# Patient Record
Sex: MALE | Race: WHITE | NOT HISPANIC OR LATINO | Employment: OTHER | URBAN - NONMETROPOLITAN AREA
[De-identification: names, ages, dates, MRNs, and addresses within clinical notes are randomized per-mention and may not be internally consistent; named-entity substitution may affect disease eponyms.]

---

## 2017-02-20 PROBLEM — R53.83 FATIGUE: Status: ACTIVE | Noted: 2017-02-20

## 2017-02-20 PROBLEM — E11.9 DM (DIABETES MELLITUS) (HCC): Status: ACTIVE | Noted: 2017-02-20

## 2017-02-20 PROBLEM — I49.9 CARDIAC DYSRHYTHMIA: Status: ACTIVE | Noted: 2017-02-20

## 2017-02-20 PROBLEM — E78.5 HLD (HYPERLIPIDEMIA): Status: ACTIVE | Noted: 2017-02-20

## 2017-02-20 PROBLEM — Z95.1 S/P CABG X 3: Status: ACTIVE | Noted: 2017-02-20

## 2017-02-20 PROBLEM — I25.10 CAD (CORONARY ARTERY DISEASE): Status: ACTIVE | Noted: 2017-02-20

## 2017-02-20 PROBLEM — I10 HTN (HYPERTENSION): Status: ACTIVE | Noted: 2017-02-20

## 2017-02-20 RX ORDER — CHLORAL HYDRATE 500 MG
CAPSULE ORAL
COMMUNITY
End: 2018-02-21

## 2017-02-20 RX ORDER — NITROGLYCERIN 0.4 MG/1
0.4 TABLET SUBLINGUAL
COMMUNITY

## 2017-02-20 RX ORDER — ESCITALOPRAM OXALATE 10 MG/1
10 TABLET ORAL DAILY
COMMUNITY
End: 2019-03-26

## 2017-02-20 RX ORDER — OMEPRAZOLE 20 MG/1
20 CAPSULE, DELAYED RELEASE ORAL DAILY
COMMUNITY

## 2017-02-20 RX ORDER — ASPIRIN 81 MG/1
81 TABLET ORAL DAILY
COMMUNITY

## 2017-02-20 RX ORDER — GLIMEPIRIDE 2 MG/1
2 TABLET ORAL
COMMUNITY
End: 2019-03-26

## 2017-02-20 RX ORDER — VERAPAMIL HYDROCHLORIDE 120 MG/1
120 TABLET, FILM COATED ORAL DAILY
COMMUNITY

## 2017-02-20 RX ORDER — GLUCOSAMINE/CHONDR SU A SOD 750-600 MG
TABLET ORAL
COMMUNITY
End: 2018-02-21

## 2017-02-21 ENCOUNTER — OFFICE VISIT (OUTPATIENT)
Dept: CARDIOLOGY | Facility: CLINIC | Age: 67
End: 2017-02-21

## 2017-02-21 VITALS
HEART RATE: 60 BPM | WEIGHT: 180.6 LBS | SYSTOLIC BLOOD PRESSURE: 124 MMHG | BODY MASS INDEX: 29.02 KG/M2 | DIASTOLIC BLOOD PRESSURE: 58 MMHG | HEIGHT: 66 IN

## 2017-02-21 DIAGNOSIS — I25.10 CORONARY ARTERY DISEASE INVOLVING NATIVE HEART WITHOUT ANGINA PECTORIS, UNSPECIFIED VESSEL OR LESION TYPE: ICD-10-CM

## 2017-02-21 DIAGNOSIS — Z95.1 S/P CABG X 3: Primary | ICD-10-CM

## 2017-02-21 DIAGNOSIS — E11.9 TYPE 2 DIABETES MELLITUS WITHOUT COMPLICATION, WITHOUT LONG-TERM CURRENT USE OF INSULIN (HCC): ICD-10-CM

## 2017-02-21 PROCEDURE — 99214 OFFICE O/P EST MOD 30 MIN: CPT | Performed by: INTERNAL MEDICINE

## 2017-02-21 PROCEDURE — 93000 ELECTROCARDIOGRAM COMPLETE: CPT | Performed by: INTERNAL MEDICINE

## 2017-02-21 NOTE — PROGRESS NOTES
Subjective:     Encounter Date:02/21/2017      Patient ID: Sheeba Saunders is a 67 y.o. male.    Chief Complaint: Coronary artery disease follow-up  Coronary Artery Disease   Presents for follow-up (3v CABG '10 (LIMA-LAD, SVG-D2, SVG-PDA), subsequent  stress echo '11 low risk) visit. Pertinent negatives include no chest pain, chest pressure, chest tightness, leg swelling, palpitations or shortness of breath. Risk factors include hyperlipidemia. The symptoms have been resolved. Compliance with diet is good. Compliance with medications is good.   Hyperlipidemia   This is a chronic problem. The current episode started more than 1 year ago. Condition status: Unknown. Exacerbating diseases include diabetes. Pertinent negatives include no chest pain or shortness of breath. He is currently on no antihyperlipidemic treatment. Compliance problems include medication side effects (Previously tried lipitor and vytorin with both stopped due to intolerance).    Hypertension   This is a chronic problem. The current episode started more than 1 year ago. Pertinent negatives include no chest pain, malaise/fatigue, palpitations or shortness of breath. The current treatment provides significant improvement. There are no compliance problems.        The following portions of the patient's history were reviewed and updated as appropriate: allergies, current medications, past family history, past medical history, past social history, past surgical history and problem list.    Review of Systems   Constitution: Negative for malaise/fatigue and weight loss.   Cardiovascular: Negative for chest pain, leg swelling and palpitations.   Respiratory: Negative for chest tightness, cough and shortness of breath.               Objective:       Vitals:    02/21/17 1343   BP: 124/58   Pulse: 60        Physical Exam   Constitutional: He is oriented to person, place, and time. He appears well-developed and well-nourished. No distress.    Cardiovascular: Normal rate and regular rhythm.    Pulmonary/Chest: Effort normal and breath sounds normal. No respiratory distress.   Neurological: He is alert and oriented to person, place, and time.   Skin: Skin is warm and dry.       Lab Review:         ECG 12 Lead  Date/Time: 2/21/2017 4:57 PM  Performed by: GRISELDA RODRIGUEZ  Authorized by: GRISELDA RODRIGUEZ   Comparison: not compared with previous ECG   Rhythm: sinus rhythm  Rate: normal  Conduction: LPFB  QRS axis: right  Other findings comments: Nonspecific ST/T wave abnormality  Clinical impression: abnormal ECG        Reviewed cardiac catheterization report with subsequent operative note from 2010  Reviewed Dr. Swann's last clinic visit note from 2015  Reviewed stress echo report from 2011  Assessment/Plan:         1. Coronary artery disease: Status post three-vessel CABG 2010.  Clinically stable.  Continue aspirin 81 mg daily.  Previously intolerant to statin therapy.    2.  Dyslipidemia: As stated above, previously intolerant to both Lipitor and Vytorin.  Patient states last lipid panel was drawn in November 2016.  His goal LDL is less than 70.  Plan is to obtain lipid profile from his primary provider; if performed within 90 days within obtain approval from insurance coming for initiation of PCSK9 inhibitor.    3.  Cardiac dysrhythmia: Could not find records to substantiate this, but based upon patient's description fact he was prescribed verapamil, I believe he had nonsustained ventricular tachycardia after his initial surgery.  He has done well since being on verapamil so we will continue this at 120 mg daily.  4.  Diabetes: Management per primary provider.    ** If we obtain lipid profile and it was done greater than 90 days ago, will need to have another one drawn.  It was done within 90 days, we will move forward with authorization for PCSK9 inhibitor.  At that point, patient will need to have a short visit to come back in for  teaching.    Otherwise, routine follow-up 12 months.

## 2017-02-24 ENCOUNTER — TELEPHONE (OUTPATIENT)
Dept: CARDIOLOGY | Facility: CLINIC | Age: 67
End: 2017-02-24

## 2017-02-24 NOTE — TELEPHONE ENCOUNTER
Call placed to patient r/t start of PCSK9.  Patient will be in for initial injection/education/paperwork 3/1/17 @ 1400.  No preference per formulary, will start Repatha.

## 2017-03-01 ENCOUNTER — TELEPHONE (OUTPATIENT)
Dept: CARDIOLOGY | Facility: CLINIC | Age: 67
End: 2017-03-01

## 2017-03-01 DIAGNOSIS — E78.5 HYPERLIPIDEMIA, UNSPECIFIED HYPERLIPIDEMIA TYPE: Primary | ICD-10-CM

## 2017-03-01 NOTE — TELEPHONE ENCOUNTER
Patient arrived at 1400 for his initial PCSK9 injection. Education provided, patient verbalized understanding able to teach back and demonstrate injection. Enrollment paperwork completed with patient's signature. Repeat lab orders given to patient along with 2nd injection to be administered per patient on 3/15/17. Initial injection of Repatha 140mg/mL given subcut to the LUE. Patient tolerated w/o complaint.

## 2017-03-28 ENCOUNTER — TELEPHONE (OUTPATIENT)
Dept: CARDIOLOGY | Facility: CLINIC | Age: 67
End: 2017-03-28

## 2017-03-28 NOTE — TELEPHONE ENCOUNTER
Patient called r/t cost of PCSK9.  Per patient his cost is $330/month with [employer].  The company was going to get in touch with him r/t patient assistance but he'd not heard back.  Contact information was given to patient for f/u on patient assistance program.  Repatha was preferred.  Patient will not be able to take medication unless he receives assistance.

## 2018-02-21 ENCOUNTER — OFFICE VISIT (OUTPATIENT)
Dept: CARDIOLOGY | Facility: CLINIC | Age: 68
End: 2018-02-21

## 2018-02-21 VITALS
HEIGHT: 66 IN | HEART RATE: 57 BPM | SYSTOLIC BLOOD PRESSURE: 122 MMHG | WEIGHT: 190 LBS | BODY MASS INDEX: 30.53 KG/M2 | DIASTOLIC BLOOD PRESSURE: 60 MMHG

## 2018-02-21 DIAGNOSIS — Z95.1 S/P CABG X 3: ICD-10-CM

## 2018-02-21 DIAGNOSIS — I10 ESSENTIAL HYPERTENSION: ICD-10-CM

## 2018-02-21 DIAGNOSIS — E78.2 MIXED HYPERLIPIDEMIA: ICD-10-CM

## 2018-02-21 DIAGNOSIS — I25.10 CORONARY ARTERY DISEASE INVOLVING NATIVE HEART WITHOUT ANGINA PECTORIS, UNSPECIFIED VESSEL OR LESION TYPE: Primary | ICD-10-CM

## 2018-02-21 PROBLEM — I49.9 CARDIAC DYSRHYTHMIA: Status: RESOLVED | Noted: 2017-02-20 | Resolved: 2018-02-21

## 2018-02-21 PROCEDURE — 99214 OFFICE O/P EST MOD 30 MIN: CPT | Performed by: INTERNAL MEDICINE

## 2018-02-21 PROCEDURE — 93000 ELECTROCARDIOGRAM COMPLETE: CPT | Performed by: INTERNAL MEDICINE

## 2018-02-21 RX ORDER — ASCORBIC ACID 500 MG
500 TABLET ORAL DAILY
COMMUNITY
End: 2019-03-26

## 2018-02-21 RX ORDER — ROSUVASTATIN CALCIUM 10 MG/1
10 TABLET, COATED ORAL DAILY
COMMUNITY

## 2018-02-21 NOTE — PROGRESS NOTES
Subjective:     Encounter Date:02/21/2018      Patient ID: Sheeba Saunders is a 68 y.o. male.    Chief Complaint: CAD f/u    Mr. Saunders is a very pleasant 68-year-old male who underwent three-vessel CABG in 2010 and has been doing well since.  I last saw him in February 2017, at which point the only change that was made was an attempt to get him on PCSK9 inhibitor for hyperlipidemia since he had previously been intolerant to Crestor and Vytorin.  He did receive 2 injections but later his insurance coverage was not sufficient and he was left with too much out-of-pocket cost, resulting in discontinuation of the drug.  In the meantime, his primary physician has started on Crestor (he is unsure of the dose), and he is doing well with that.  He has no other complaints today.  Coronary Artery Disease   Presents for follow-up (3v CABG '10 (LIMA-LAD, SVG-D2, SVG-PDA), subsequent  stress echo '11 low risk) visit. Pertinent negatives include no chest pain, chest pressure, chest tightness, leg swelling, palpitations or shortness of breath. Risk factors include hyperlipidemia. The symptoms have been resolved. Compliance with diet is good. Compliance with exercise is variable. Compliance with medications is good.   Hyperlipidemia   This is a chronic problem. The current episode started more than 1 year ago. Condition status: Unknown. Exacerbating diseases include diabetes. Pertinent negatives include no chest pain, leg pain or shortness of breath. Current antihyperlipidemic treatment includes statins. Compliance problems include medication side effects and medication cost (Previously tried lipitor and vytorin with both stopped due to intolerance. Tried Repatha after '17 visit but too expensive).    Hypertension   This is a chronic problem. The current episode started more than 1 year ago. The problem is controlled. Pertinent negatives include no chest pain, malaise/fatigue, orthopnea, palpitations, PND or shortness of  breath. The current treatment provides significant improvement. There are no compliance problems.      Recently started doing some exercise as directed by diabetes education class.   Walked 1 mile yesterday without angina or dyspnea.      The following portions of the patient's history were reviewed and updated as appropriate: allergies, current medications, past family history, past medical history, past social history, past surgical history and problem list.    Review of Systems   Constitution: Negative for malaise/fatigue.   Cardiovascular: Negative for chest pain, claudication, dyspnea on exertion, leg swelling, near-syncope, orthopnea, palpitations, paroxysmal nocturnal dyspnea and syncope.   Respiratory: Negative for chest tightness and shortness of breath.    Hematologic/Lymphatic: Does not bruise/bleed easily.        Objective:      Vitals:    02/21/18 1456   BP: 122/60   Pulse: 57     Physical Exam   Constitutional: He is oriented to person, place, and time. He appears well-developed and well-nourished.   Neck: No JVD present.   Cardiovascular: Normal rate, regular rhythm, normal heart sounds and intact distal pulses.    No murmur heard.  Pulmonary/Chest: Effort normal and breath sounds normal.   Musculoskeletal: He exhibits no edema.   Neurological: He is alert and oriented to person, place, and time.   Skin: Skin is warm and dry.       Lab Review:         ECG 12 Lead  Date/Time: 2/21/2018 3:16 PM  Performed by: GRISELDA RODRIGUEZ  Authorized by: GRISELDA RODRIGUEZ   Rhythm: sinus bradycardia  BPM: 57  Conduction: LPFB  Other findings comments: Nonspecific T wave abnormality in precordial leads (unchanged from last year's ecg)  Clinical impression: abnormal ECG        LABS REVIEWED FROM June 27, 2017: , , LDL 54, HDL 33    Assessment/Plan:     Problem List Items Addressed This Visit        Cardiovascular and Mediastinum    HLD (hyperlipidemia)    Current Assessment & Plan     LDL well controlled.   Continue crestor; patient to call us back to confirm dose.         Relevant Medications    rosuvastatin (CRESTOR) 5 MG tablet    CAD (coronary artery disease) - Primary    Overview     3v CABG '10 (LIMA-LAD, SVG-D1, SVG-PDA) with subsequent low-risk stress echo in '11)         Current Assessment & Plan     Coronary artery disease is stable; asymptomatic.  Continue current treatment regimen (ASA 81mg daily for life, BB, and statin).  Weight loss; goal 15-20 lbs over next year.  Regular aerobic exercise. Gave goal of 150 minutes of moderately vigorous activity per week.  Cardiac status will be reassessed in 1 year.         Relevant Orders    ECG 12 Lead    HTN (hypertension)    Current Assessment & Plan     Hypertension is well controlled.  Continue current treatment regimen.  Blood pressure will be reassessed at the next regular appointment.            Other    S/P CABG x 3        F/u 1 year, or sooner if new symptoms develop    Wagner Bennett MD  02/21/2018  4:01 PM

## 2018-02-21 NOTE — ASSESSMENT & PLAN NOTE
Coronary artery disease is stable; asymptomatic.  Continue current treatment regimen (ASA 81mg daily for life, BB, and statin).  Weight loss; goal 15-20 lbs over next year.  Regular aerobic exercise. Gave goal of 150 minutes of moderately vigorous activity per week.  Cardiac status will be reassessed in 1 year.

## 2019-03-26 ENCOUNTER — OFFICE VISIT (OUTPATIENT)
Dept: CARDIOLOGY | Facility: CLINIC | Age: 69
End: 2019-03-26

## 2019-03-26 VITALS
SYSTOLIC BLOOD PRESSURE: 145 MMHG | BODY MASS INDEX: 29.09 KG/M2 | HEART RATE: 64 BPM | RESPIRATION RATE: 18 BRPM | HEIGHT: 66 IN | WEIGHT: 181 LBS | DIASTOLIC BLOOD PRESSURE: 80 MMHG

## 2019-03-26 DIAGNOSIS — I25.10 CORONARY ARTERY DISEASE INVOLVING NATIVE HEART WITHOUT ANGINA PECTORIS, UNSPECIFIED VESSEL OR LESION TYPE: Primary | ICD-10-CM

## 2019-03-26 DIAGNOSIS — Z95.1 S/P CABG X 3: ICD-10-CM

## 2019-03-26 DIAGNOSIS — I10 ESSENTIAL HYPERTENSION: ICD-10-CM

## 2019-03-26 DIAGNOSIS — E78.2 MIXED HYPERLIPIDEMIA: ICD-10-CM

## 2019-03-26 PROCEDURE — 99214 OFFICE O/P EST MOD 30 MIN: CPT | Performed by: NURSE PRACTITIONER

## 2019-03-26 PROCEDURE — 93000 ELECTROCARDIOGRAM COMPLETE: CPT | Performed by: NURSE PRACTITIONER

## 2019-03-26 RX ORDER — TAMSULOSIN HYDROCHLORIDE 0.4 MG/1
1 CAPSULE ORAL NIGHTLY
COMMUNITY

## 2019-03-26 RX ORDER — MAGNESIUM OXIDE 400 MG/1
400 TABLET ORAL DAILY
COMMUNITY

## 2019-03-26 NOTE — PROGRESS NOTES
Subjective:     Encounter Date:03/26/2019      Patient ID: Sheeba Saunders is a 69 y.o. male.    Chief Complaint:  Coronary Artery Disease   Presents for follow-up visit. Pertinent negatives include no chest pain, chest pressure, chest tightness, dizziness, leg swelling, muscle weakness, palpitations, shortness of breath or weight gain. Risk factors include hyperlipidemia. The symptoms have been stable. Compliance with diet is good. Compliance with exercise is good. Compliance with medications is good.   Hypertension   This is a chronic problem. The current episode started more than 1 year ago. The problem is controlled. Pertinent negatives include no chest pain, headaches, malaise/fatigue, orthopnea, palpitations, PND or shortness of breath. Past treatments include beta blockers.   Hyperlipidemia   This is a chronic problem. The current episode started more than 1 year ago. The problem is controlled. Recent lipid tests were reviewed and are low. Pertinent negatives include no chest pain or shortness of breath. Current antihyperlipidemic treatment includes statins. The current treatment provides significant improvement of lipids.     Patient presents today for yearly follow up. Patient previously followed by Dr. Swann and now by Dr. Bennett. Patient has a history of CABG in 2010 LIMA-LAD, SVG-D1 and SVG-PDA. Patient overall has done well. Patient is active and mows multiple yards and tolerates well. He sees Dr. Hathaway as his regular physician. Lipids are well controlled. Non-smoker. Denies chest pain, shortness of breath, palpitations, swelling on legs. History diabetes, hyperlipidemia and hypertension. Reports blood sugars are well controlled.     The following portions of the patient's history were reviewed and updated as appropriate: allergies, current medications, past family history, past medical history, past social history, past surgical history and problem list.   Prior to Admission medications     Medication Sig Start Date End Date Taking? Authorizing Provider   aspirin 81 MG EC tablet Take 81 mg by mouth Daily.   Yes Linnea Moreau MD   exenatide er (BYDUREON) 2 MG pen-injector injection Inject 2 mg under the skin into the appropriate area as directed 1 (One) Time Per Week.   Yes Linnea Moreau MD   magnesium oxide (MAG-OX) 400 MG tablet Take 400 mg by mouth Daily.   Yes Linnea Moreau MD   metFORMIN (GLUCOPHAGE) 1000 MG tablet Take 1,000 mg by mouth 2 (Two) Times a Day With Meals.   Yes Linnea Moreau MD   metoprolol tartrate (LOPRESSOR) 25 MG tablet Take 25 mg by mouth Daily.   Yes Linnea Moreau MD   Multiple Vitamin (MULTIVITAMINS PO) Take  by mouth.   Yes Linnea Moreau MD   nitroglycerin (NITROSTAT) 0.4 MG SL tablet Place 0.4 mg under the tongue Every 5 (Five) Minutes As Needed for chest pain. Take no more than 3 doses in 15 minutes.   Yes Linnea Moreau MD   omeprazole (priLOSEC) 20 MG capsule Take 20 mg by mouth Daily.   Yes Linnea Moreau MD   rosuvastatin (CRESTOR) 10 MG tablet Take 10 mg by mouth Daily.   Yes Linnea Moreau MD   tamsulosin (FLOMAX) 0.4 MG capsule 24 hr capsule Take 1 capsule by mouth Every Night.   Yes Linnea Moreau MD   verapamil (CALAN) 120 MG tablet Take 120 mg by mouth Daily.   Yes Linnea Moreau MD   Dulaglutide (TRULICITY) 1.5 MG/0.5ML solution pen-injector Inject  under the skin.  3/26/19 Yes Linnea Moreau MD   escitalopram (LEXAPRO) 10 MG tablet Take 10 mg by mouth Daily.  3/26/19 Yes Linnea Moreau MD   glimepiride (AMARYL) 2 MG tablet Take 2 mg by mouth Every Morning Before Breakfast.  3/26/19  Linnea Moreau MD   vitamin C (ASCORBIC ACID) 500 MG tablet Take 500 mg by mouth Daily.  3/26/19  Linnea Moreau MD     Past Medical History:   Diagnosis Date   • CAD in native artery    • Cardiac dysrhythmia    • Controlled diabetes mellitus type II without complication  (CMS/HCC)    • Fatigue    • Hyperlipidemia, mixed    • Hypertension     Benign   • S/P CABG x 3        Review of Systems   Constitution: Negative for chills, decreased appetite, fever, malaise/fatigue, weight gain and weight loss.   HENT: Negative for nosebleeds.    Eyes: Negative for visual disturbance.   Cardiovascular: Negative for chest pain, dyspnea on exertion, leg swelling, near-syncope, orthopnea, palpitations, paroxysmal nocturnal dyspnea and syncope.   Respiratory: Negative for chest tightness, cough, hemoptysis, shortness of breath and snoring.    Endocrine: Negative for cold intolerance and heat intolerance.   Hematologic/Lymphatic: Negative for bleeding problem. Does not bruise/bleed easily.   Skin: Negative for rash.   Musculoskeletal: Negative for back pain, falls and muscle weakness.   Gastrointestinal: Negative for abdominal pain, constipation, diarrhea, heartburn, melena, nausea and vomiting.   Genitourinary: Negative for hematuria.   Neurological: Negative for dizziness, headaches and light-headedness.   Psychiatric/Behavioral: Negative for altered mental status.   Allergic/Immunologic: Negative for persistent infections.         ECG 12 Lead  Date/Time: 3/26/2019 3:17 PM  Performed by: Adrian Perry APRN  Authorized by: Adrian Perry APRN   Comparison: compared with previous ECG from 2/21/2018  Similar to previous ECG  Rhythm: sinus rhythm               Objective:     Physical Exam   Constitutional: He is oriented to person, place, and time. He appears well-developed and well-nourished.   HENT:   Head: Normocephalic and atraumatic.   Eyes: Pupils are equal, round, and reactive to light.   Neck: Normal range of motion. Neck supple. No JVD present. Carotid bruit is not present.   Cardiovascular: Normal rate, regular rhythm, normal heart sounds and intact distal pulses.   Pulmonary/Chest: Effort normal and breath sounds normal.   Abdominal: Soft. Bowel sounds are normal.   Musculoskeletal:  "Normal range of motion.   Neurological: He is alert and oriented to person, place, and time. He has normal reflexes.   Skin: Skin is warm and dry.   Psychiatric: He has a normal mood and affect. His behavior is normal. Judgment and thought content normal.     Blood pressure 145/80, pulse 64, resp. rate 18, height 167.6 cm (66\"), weight 82.1 kg (181 lb).      Lab Review:       Assessment:          Diagnosis Plan   1. Coronary artery disease involving native heart without angina pectoris, unspecified vessel or lesion type     2. Mixed hyperlipidemia     3. Essential hypertension     4. S/P CABG x 3     5. BMI 29.0-29.9,adult            Plan:       1. Coronary Artery Disease- CABG with LIMA-LAD, SVG-D1 and SVG-PDA. No clinical evidence of ischemia. On aspirin and statin. LDL at goal  2. Mixed Hyperlipidemia- LDL 49  3. Blood pressure borderline high today- but on cold medicine- he follows and reports runs systolic 120  4. History of CABG  Patient's Body mass index is 29.21 kg/m². BMI is above normal parameters. Recommendations include: exercise counseling and nutrition counseling.         "

## 2020-04-22 ENCOUNTER — OFFICE VISIT (OUTPATIENT)
Dept: CARDIOLOGY | Facility: CLINIC | Age: 70
End: 2020-04-22

## 2020-04-22 VITALS — WEIGHT: 192 LBS | BODY MASS INDEX: 30.99 KG/M2 | DIASTOLIC BLOOD PRESSURE: 70 MMHG | SYSTOLIC BLOOD PRESSURE: 124 MMHG

## 2020-04-22 DIAGNOSIS — Z95.1 S/P CABG X 3: ICD-10-CM

## 2020-04-22 DIAGNOSIS — I10 ESSENTIAL HYPERTENSION: ICD-10-CM

## 2020-04-22 DIAGNOSIS — I25.10 CORONARY ARTERY DISEASE INVOLVING NATIVE HEART WITHOUT ANGINA PECTORIS, UNSPECIFIED VESSEL OR LESION TYPE: Primary | ICD-10-CM

## 2020-04-22 DIAGNOSIS — E78.2 MIXED HYPERLIPIDEMIA: ICD-10-CM

## 2020-04-22 DIAGNOSIS — E11.59 TYPE 2 DIABETES MELLITUS WITH OTHER CIRCULATORY COMPLICATION, WITHOUT LONG-TERM CURRENT USE OF INSULIN (HCC): ICD-10-CM

## 2020-04-22 PROCEDURE — 99442 PR PHYS/QHP TELEPHONE EVALUATION 11-20 MIN: CPT | Performed by: NURSE PRACTITIONER

## 2020-04-22 RX ORDER — INSULIN GLARGINE 100 [IU]/ML
40 INJECTION, SOLUTION SUBCUTANEOUS DAILY
COMMUNITY

## 2020-04-22 NOTE — PROGRESS NOTES
Subjective:     Encounter Date:04/22/2020      Patient ID: Sheeba Saunders is a 70 y.o. male with a history of coronary artery disease s/p 3v CABG (LIMA-LAD, SVG- D2, and SVG-PDA) in '10, hyperlipidemia, and hypertension.    You have chosen to receive care through a telephone visit. Do you consent to use a telephone visit for your medical care today? YES     This visit has been rescheduled as a phone visit to comply with patient safety concerns in accordance with CDC recommendations. Total time of discussion was 15 minutes.    Chief Complaint: follow up  Coronary Artery Disease   Presents for follow-up visit. Pertinent negatives include no chest pain, chest pressure, chest tightness, dizziness, leg swelling, muscle weakness, palpitations, shortness of breath or weight gain. Risk factors include hyperlipidemia and obesity. The symptoms have been stable.   Hypertension   This is a chronic problem. The current episode started more than 1 year ago. The problem has been rapidly improving since onset. The problem is controlled. Pertinent negatives include no chest pain, malaise/fatigue, orthopnea, palpitations, peripheral edema, PND or shortness of breath.   Hyperlipidemia   This is a chronic problem. The current episode started more than 1 year ago. The problem is controlled. Recent lipid tests were reviewed and are low. Exacerbating diseases include diabetes and obesity. Pertinent negatives include no chest pain or shortness of breath.     Patient was called today for a follow up. He states he has been well. 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 He denies chest pain, shortness of breath, palpitations, orthopnea, PND, swelling, decrease in stamina and increase in fatigue.     The following portions of the patient's history were reviewed and updated as appropriate: allergies, current medications, past family history, past medical history, past social history, past  surgical history and problem list.    Allergies   Allergen Reactions   • Latex Hives     blisters         Current Outpatient Medications:   •  aspirin 81 MG EC tablet, Take 81 mg by mouth Daily., Disp: , Rfl:   •  exenatide er (BYDUREON) 2 MG pen-injector injection, Inject 2 mg under the skin into the appropriate area as directed 1 (One) Time Per Week., Disp: , Rfl:   •  Insulin Glargine (BASAGLAR KWIKPEN) 100 UNIT/ML injection pen, Inject 40 Units under the skin into the appropriate area as directed Daily., Disp: , Rfl:   •  magnesium oxide (MAG-OX) 400 MG tablet, Take 400 mg by mouth Daily., Disp: , Rfl:   •  metoprolol tartrate (LOPRESSOR) 25 MG tablet, Take 25 mg by mouth Daily., Disp: , Rfl:   •  Multiple Vitamin (MULTIVITAMINS PO), Take 1 tablet by mouth Daily., Disp: , Rfl:   •  nitroglycerin (NITROSTAT) 0.4 MG SL tablet, Place 0.4 mg under the tongue Every 5 (Five) Minutes As Needed for chest pain. Take no more than 3 doses in 15 minutes., Disp: , Rfl:   •  omeprazole (priLOSEC) 20 MG capsule, Take 20 mg by mouth Daily., Disp: , Rfl:   •  rosuvastatin (CRESTOR) 10 MG tablet, Take 10 mg by mouth Daily., Disp: , Rfl:   •  tamsulosin (FLOMAX) 0.4 MG capsule 24 hr capsule, Take 1 capsule by mouth Every Night., Disp: , Rfl:   •  verapamil (CALAN) 120 MG tablet, Take 120 mg by mouth Daily., Disp: , Rfl:   Past Medical History:   Diagnosis Date   • CAD in native artery    • Cardiac dysrhythmia    • Controlled diabetes mellitus type II without complication (CMS/HCC)    • Fatigue    • Hyperlipidemia, mixed    • Hypertension     Benign   • S/P CABG x 3        Social History     Socioeconomic History   • Marital status: Unknown     Spouse name: Not on file   • Number of children: Not on file   • Years of education: Not on file   • Highest education level: Not on file   Tobacco Use   • Smoking status: Former Smoker     Last attempt to quit:      Years since quittin.3   • Smokeless tobacco: Never Used    Substance and Sexual Activity   • Alcohol use: No   • Drug use: No   • Sexual activity: Defer       Review of Systems   Constitution: Negative for malaise/fatigue, weight gain and weight loss.   Cardiovascular: Negative for chest pain, dyspnea on exertion, irregular heartbeat, leg swelling, near-syncope, orthopnea, palpitations, paroxysmal nocturnal dyspnea and syncope.   Respiratory: Negative for chest tightness, cough, shortness of breath, sleep disturbances due to breathing, sputum production and wheezing.    Skin: Negative for dry skin, flushing, itching and rash.   Musculoskeletal: Negative for muscle weakness.   Gastrointestinal: Negative for hematemesis and hematochezia.   Neurological: Negative for dizziness, light-headedness, loss of balance and weakness.       Procedures  /70   Wt 87.1 kg (192 lb)   BMI 30.99 kg/m²        Objective:     Physical Exam   Constitutional: He is oriented to person, place, and time. Vital signs are normal. No distress.   Pulmonary/Chest: No respiratory distress.   Neurological: He is alert and oriented to person, place, and time.   Psychiatric: He has a normal mood and affect. His speech is normal and behavior is normal. Judgment and thought content normal. Cognition and memory are normal.       Lab Review:   I have reviewed previous office notes, most recent labs and most recent cardiac testing.          Assessment:          Diagnosis Plan   1. Coronary artery disease involving native heart without angina pectoris, unspecified vessel or lesion type     2. S/P CABG x 3     3. Essential hypertension     4. Mixed hyperlipidemia     5. Type 2 diabetes mellitus with other circulatory complication, without long-term current use of insulin (CMS/[facility])          Plan:       1. CAD- stable. No clinical signs of ischemia. On ASA, statin, BB and CCB.   2. S/p CABG- 3v in '10  3. HTN- controlled. CPT.  4. HLD- reports controlled. Last LDL 49 in '19. On statin. Has had labs done with  his PCP a few weeks ago. Will obtain these results.   5. DM2- 7.7 A1C. Followed by PCP.     Follow up in 1 year or sooner if symptoms worsen.

## 2020-04-23 ENCOUNTER — TELEPHONE (OUTPATIENT)
Dept: CARDIOLOGY | Facility: CLINIC | Age: 70
End: 2020-04-23

## 2020-04-23 DIAGNOSIS — E78.2 MIXED HYPERLIPIDEMIA: Primary | ICD-10-CM

## 2020-04-23 NOTE — TELEPHONE ENCOUNTER
Patient called and left a voicemail stating that he had a missed call from CANDY Finley.  Please call when available.

## 2021-03-10 ENCOUNTER — OFFICE VISIT (OUTPATIENT)
Dept: GASTROENTEROLOGY | Age: 71
End: 2021-03-10

## 2021-03-10 VITALS
HEIGHT: 66 IN | WEIGHT: 201.8 LBS | HEART RATE: 59 BPM | BODY MASS INDEX: 32.43 KG/M2 | OXYGEN SATURATION: 96 % | SYSTOLIC BLOOD PRESSURE: 120 MMHG | DIASTOLIC BLOOD PRESSURE: 70 MMHG

## 2021-03-10 DIAGNOSIS — Z12.11 SCREENING FOR COLON CANCER: ICD-10-CM

## 2021-03-10 DIAGNOSIS — Z86.010 HISTORY OF ADENOMATOUS POLYP OF COLON: Primary | ICD-10-CM

## 2021-03-10 PROCEDURE — 99999 PR OFFICE/OUTPT VISIT,PROCEDURE ONLY: CPT | Performed by: NURSE PRACTITIONER

## 2021-03-10 RX ORDER — TAMSULOSIN HYDROCHLORIDE 0.4 MG/1
0.4 CAPSULE ORAL DAILY
COMMUNITY

## 2021-03-10 RX ORDER — NITROGLYCERIN 0.4 MG/1
0.4 TABLET SUBLINGUAL EVERY 5 MIN PRN
COMMUNITY

## 2021-03-10 RX ORDER — INSULIN GLARGINE 100 [IU]/ML
40 INJECTION, SOLUTION SUBCUTANEOUS NIGHTLY
COMMUNITY

## 2021-03-10 RX ORDER — CALCIUM CARBONATE 300MG(750)
1 TABLET,CHEWABLE ORAL DAILY
COMMUNITY

## 2021-03-10 ASSESSMENT — ENCOUNTER SYMPTOMS
NAUSEA: 0
SHORTNESS OF BREATH: 0
RECTAL PAIN: 0
DIARRHEA: 0
ABDOMINAL PAIN: 0
BLOOD IN STOOL: 0
COUGH: 0
ABDOMINAL DISTENTION: 0
TROUBLE SWALLOWING: 0
CONSTIPATION: 0
CHOKING: 0
VOICE CHANGE: 0
VOMITING: 0

## 2021-03-10 NOTE — PATIENT INSTRUCTIONS
Schedule colonoscopy. No aspirin, ibuprofen, naproxen, fish oil or vitamin E for 5 days before procedure. Do not eat or drink after midnight the day of the procedure. Allowed medications can be taken with a small sip of water. Please review your prep instructions for allowed medications. You will not be able to drive for 24 hours after the procedure due to sedation. Bring a  with you the day of the procedure. If you are on blood thinners, clearance from the prescribing physician will be obtained before your procedure is scheduled. If it is determined it is not safe to hold these medications for a short time an alternative procedure for evaluation may be recommended. Risks of colonoscopy include, but are not limited to, perforation, bleeding, and infection, Risk of perforation and bleeding are increased if there is a polyp removed. Anesthesia risks will be reviewed with you before the procedure by a member of the anesthesia department. Your physician may also schedule a follow up appointment with the nurse practitioner to discuss pathology, symptoms or to check if you have had any problems related to your procedure. If you prefer not to return to the office after your procedure please discuss this with your physician on the day of your colonoscopy. The physician will talk with you and/or your family after the procedure is completed. Final recommendations are based on the pathologist report if biopsies or specimens are taken. For Colonoscopy: You will be given specific directions regarding restrictions to diet and bowel prep instructions including laxatives. Please read these instructions one week prior to your scheduled procedure to ensure that you are prepared. If you have any questions regarding these instructions please call our office Mon through Fri from 8:00 am to 4:00 pm.     Follow prep instructions provided for bowel prep. Take all of the bowel prep as directed.  If you are having problems with nausea, stop your prep for 30-45 min to allow the nausea to subside before resuming your prep. It is important to drink plenty of fluids throughout the day before taking your laxatives. This will help to protect your kidneys, prevent dehydration and maximize the effect of the bowel prep. If polyps are removed during the procedure they will be sent to a pathologist for analysis. Unless you have a follow up appointment scheduled, you will be notified by mail of the pathology results within 4 weeks. If you have not received results after 4 weeks you may call the office to obtain this information. Your diet before a colonoscopy bowel preparation is very important to ensure a successful colon exam. It is recommended to consider certain changes to your diet three to four days prior to the procedure. Remember that your bowels need to be empty for the exam.    What foods are good to eat? Cut down on heavy solid foods three to four days before the procedure and start introducing lighter meals to your diet. The following food suggestions are a good part of your diet before a colonoscopy bowel preparation. Light meat that is easily digestible such as chicken (without the skin)   Potatoes without skin   Cheese   Eggs   A light meal of steamed white fish   Light clear soups    Foods and drinks to avoid  Avoid foods that contain too much fiber. Stay clear of dark colored beverages. They can stick to the walls of the digestive tract and make it difficult to differentiate from blood. Some of these foods are:  Red meat, rice, nuts and vegetables   Milk, other milk based fluids and cream   Most fruit and puddings   Whole grain pasta   Cereals, bran and seeds   Colored beverages, especially those that are red or purple in color   Red colored Jell-O   On the day before the colonoscopy, continue to drink plenty of clear fluids.  It is important   to keep yourself hydrated before the exam. Please follow all instructions as provided for cleansing the bowel. Failure to have an adequately prepped colon may cause you to have incomplete exam with further testing required.      [email or web address]

## 2021-03-10 NOTE — PROGRESS NOTES
Subjective:      Patient ID: Osmel Ramsey is a 66 y.o. male  Bijan Fields  No ref. provider found    HPI  Chief Complaint   Patient presents with    Colon Cancer Screening       Patient with history of TVA. Last c-scope 3/2016 negative. Due for repeat screening colonoscopy. The patient denies abdominal or flank pain, anorexia, nausea or vomiting, dysphagia, change in bowel habits or black or bloody stools or weight loss. Assessment:      1. History of adenomatous polyp of colon    2. Screening for colon cancer            Plan:      Schedule colonoscopy    Instruct on bowel prep. Nothing to eat or drink after midnight the day of the exam.  Unable to drive for 24 hours after the procedure. No aspirin or nonsteroidal anti-inflammatories for 5 days before procedure. I have discussed the benefits, alternatives, and risks (including bleeding, perforation and death)  for pursuing Endoscopy (EGD/Colonscopy/EUS/ERCP) with the patient and they are willing to continue. We also discussed the need for anesthesia, IV access, proper dietary changes, medication changes if necessary, and need for bowel prep (if ordered) prior to their Endoscopic procedure. They are aware they must have someone accompany them to their scheduled procedure to drive them home - they agree to the above and are willing to continue.      Plan for anesthesia: MAC  no reported complications                    Family History   Problem Relation Age of Onset    Cancer Mother         stomach cancer    Stomach Cancer Mother     Cancer Father         pancreatic cancer    Colon Cancer Neg Hx     Colon Polyps Neg Hx     Esophageal Cancer Neg Hx     Liver Cancer Neg Hx     Liver Disease Neg Hx     Rectal Cancer Neg Hx        Past Medical History:   Diagnosis Date    History of adenomatous polyp of colon     Hyperlipidemia     Hypertension     Type II or unspecified type diabetes mellitus without mention of complication, not stated as uncontrolled        Past Surgical History:   Procedure Laterality Date    COLONOSCOPY  5-    Dr Corrine Nickerson    COLONOSCOPY  1/2013    tubulovillous adenoma of cecum    COLONOSCOPY  3/11/16    Dr GRETA Fulton-diverticular disease, 5 yr recall    CORONARY ARTERY BYPASS GRAFT         Current Outpatient Medications   Medication Sig Dispense Refill    insulin glargine (BASAGLAR KWIKPEN) 100 UNIT/ML injection pen Inject 40 Units into the skin nightly      tamsulosin (FLOMAX) 0.4 MG capsule Take 0.4 mg by mouth daily      Rosuvastatin Calcium 10 MG CPSP Take 1 capsule by mouth nightly      nitroGLYCERIN (NITROSTAT) 0.4 MG SL tablet Place 0.4 mg under the tongue every 5 minutes as needed for Chest pain up to max of 3 total doses. If no relief after 1 dose, call 911.  Magnesium 400 MG TABS Take 1 tablet by mouth daily      Bioflavonoid Products (BIOFLEX PO) Take 1,500 mg by mouth daily      omeprazole (PRILOSEC) 20 MG capsule Take 20 mg by mouth daily      verapamil (CALAN) 120 MG tablet Take 120 mg by mouth daily      exenatide (BYDUREON) 2 MG SUSR injection Inject 2 mg into the skin once a week      Metoprolol Tartrate (LOPRESSOR PO) Take 25 mg by mouth daily Indications: half daily       aspirin 81 MG tablet Take 81 mg by mouth daily.  multivitamin (THERAGRAN) per tablet Take 1 tablet by mouth daily. No current facility-administered medications for this visit. Allergies   Allergen Reactions    Latex Hives     blisters        reports that he quit smoking about 44 years ago. His smoking use included cigarettes. He smoked 0.00 packs per day for 0.00 years. He has never used smokeless tobacco. He reports that he does not drink alcohol or use drugs. Review of Systems   Constitutional: Negative for appetite change, fever and unexpected weight change. HENT: Negative for trouble swallowing and voice change. Respiratory: Negative for cough, choking and shortness of breath. Cardiovascular: Negative for chest pain and palpitations. Gastrointestinal: Negative for abdominal distention, abdominal pain, blood in stool, constipation, diarrhea, nausea, rectal pain and vomiting. Musculoskeletal: Negative for arthralgias and joint swelling. Skin: Negative for pallor, rash and wound. Neurological: Negative for weakness and light-headedness. Hematological: Negative for adenopathy. Does not bruise/bleed easily. Objective:   Physical Exam  Constitutional:       General: He is not in acute distress. Appearance: Normal appearance. He is well-developed. Comments: /70 (Site: Left Upper Arm, Position: Sitting, Cuff Size: Medium Adult)   Pulse 59   Ht 5' 6\" (1.676 m)   Wt 201 lb 12.8 oz (91.5 kg)   SpO2 96%   BMI 32.57 kg/m²    Cardiovascular:      Rate and Rhythm: Normal rate and regular rhythm. Heart sounds: No murmur. Pulmonary:      Effort: Pulmonary effort is normal. No respiratory distress. Breath sounds: Normal breath sounds. Abdominal:      General: Bowel sounds are normal. There is no distension. Palpations: Abdomen is soft. There is no mass. Tenderness: There is no abdominal tenderness. There is no guarding or rebound. Skin:     General: Skin is warm and dry. Coloration: Skin is not pale. Neurological:      Mental Status: He is alert and oriented to person, place, and time.

## 2021-03-26 ENCOUNTER — OFFICE VISIT (OUTPATIENT)
Age: 71
End: 2021-03-26

## 2021-03-26 VITALS — OXYGEN SATURATION: 96 % | HEART RATE: 70 BPM

## 2021-03-26 DIAGNOSIS — Z11.59 SCREENING FOR VIRAL DISEASE: Primary | ICD-10-CM

## 2021-03-26 LAB — SARS-COV-2, PCR: NOT DETECTED

## 2021-03-26 PROCEDURE — 99999 PR OFFICE/OUTPT VISIT,PROCEDURE ONLY: CPT | Performed by: NURSE PRACTITIONER

## 2021-03-26 NOTE — PROGRESS NOTES
Patient was not evaluated in the clinic, swab was collected due to pre-op testing requirement to screen for COVID-19 using [employer] Max testing. ,

## 2021-03-29 ENCOUNTER — HOSPITAL ENCOUNTER (OUTPATIENT)
Age: 71
Setting detail: SPECIMEN
Discharge: HOME | End: 2021-03-29
Payer: MEDICARE

## 2021-03-29 ENCOUNTER — HOSPITAL ENCOUNTER (OUTPATIENT)
Age: 71
Setting detail: OUTPATIENT SURGERY
Discharge: HOME | End: 2021-03-29
Attending: INTERNAL MEDICINE | Admitting: INTERNAL MEDICINE
Payer: MEDICARE

## 2021-03-29 ENCOUNTER — ANESTHESIA (OUTPATIENT)
Dept: OPERATING ROOM | Age: 71
End: 2021-03-29

## 2021-03-29 ENCOUNTER — APPOINTMENT (OUTPATIENT)
Dept: OPERATING ROOM | Age: 71
End: 2021-03-29

## 2021-03-29 ENCOUNTER — ANESTHESIA EVENT (OUTPATIENT)
Dept: OPERATING ROOM | Age: 71
End: 2021-03-29

## 2021-03-29 VITALS
RESPIRATION RATE: 18 BRPM | HEART RATE: 54 BPM | OXYGEN SATURATION: 99 % | BODY MASS INDEX: 31.18 KG/M2 | DIASTOLIC BLOOD PRESSURE: 73 MMHG | SYSTOLIC BLOOD PRESSURE: 159 MMHG | HEIGHT: 66 IN | TEMPERATURE: 97.7 F | WEIGHT: 194 LBS

## 2021-03-29 VITALS — OXYGEN SATURATION: 97 % | SYSTOLIC BLOOD PRESSURE: 140 MMHG | DIASTOLIC BLOOD PRESSURE: 70 MMHG

## 2021-03-29 PROCEDURE — 45381 COLONOSCOPY SUBMUCOUS NJX: CPT | Performed by: INTERNAL MEDICINE

## 2021-03-29 PROCEDURE — 45385 COLONOSCOPY W/LESION REMOVAL: CPT | Performed by: INTERNAL MEDICINE

## 2021-03-29 PROCEDURE — G8907 PT DOC NO EVENTS ON DISCHARG: HCPCS

## 2021-03-29 PROCEDURE — 45381 COLONOSCOPY SUBMUCOUS NJX: CPT

## 2021-03-29 PROCEDURE — 88305 TISSUE EXAM BY PATHOLOGIST: CPT

## 2021-03-29 PROCEDURE — 45385 COLONOSCOPY W/LESION REMOVAL: CPT

## 2021-03-29 PROCEDURE — G8918 PT W/O PREOP ORDER IV AB PRO: HCPCS

## 2021-03-29 RX ORDER — SODIUM CHLORIDE 9 MG/ML
INJECTION, SOLUTION INTRAVENOUS CONTINUOUS
Status: DISCONTINUED | OUTPATIENT
Start: 2021-03-29 | End: 2021-03-29 | Stop reason: HOSPADM

## 2021-03-29 RX ORDER — PROPOFOL 10 MG/ML
INJECTION, EMULSION INTRAVENOUS PRN
Status: DISCONTINUED | OUTPATIENT
Start: 2021-03-29 | End: 2021-03-29 | Stop reason: SDUPTHER

## 2021-03-29 RX ORDER — LIDOCAINE HYDROCHLORIDE 10 MG/ML
INJECTION, SOLUTION INFILTRATION; PERINEURAL PRN
Status: DISCONTINUED | OUTPATIENT
Start: 2021-03-29 | End: 2021-03-29 | Stop reason: SDUPTHER

## 2021-03-29 RX ADMIN — LIDOCAINE HYDROCHLORIDE 40 MG: 10 INJECTION, SOLUTION INFILTRATION; PERINEURAL at 10:00

## 2021-03-29 RX ADMIN — SODIUM CHLORIDE: 9 INJECTION, SOLUTION INTRAVENOUS at 09:24

## 2021-03-29 RX ADMIN — PROPOFOL 200 MG: 10 INJECTION, EMULSION INTRAVENOUS at 10:00

## 2021-03-29 NOTE — ANESTHESIA PRE PROCEDURE
Department of Anesthesiology  Preprocedure Note       Name:  Kenneth Cheng   Age:  66 y.o.  :  1950                                          MRN:  [id number]         Date:  3/29/2021      Surgeon: Kathi Johnson):  Dank Arita MD    Procedure: Procedure(s):  COLORECTAL CANCER SCREENING, NOT HIGH RISK    Medications prior to admission:   Prior to Admission medications    Medication Sig Start Date End Date Taking? Authorizing Provider   verapamil (CALAN) 120 MG tablet Take 120 mg by mouth daily   Yes Historical Provider, MD   Metoprolol Tartrate (LOPRESSOR PO) Take 25 mg by mouth daily Indications: half daily    Yes Historical Provider, MD   insulin glargine (BASAGLAR KWIKPEN) 100 UNIT/ML injection pen Inject 40 Units into the skin nightly    Historical Provider, MD   tamsulosin (FLOMAX) 0.4 MG capsule Take 0.4 mg by mouth daily    Historical Provider, MD   Rosuvastatin Calcium 10 MG CPSP Take 1 capsule by mouth nightly    Historical Provider, MD   nitroGLYCERIN (NITROSTAT) 0.4 MG SL tablet Place 0.4 mg under the tongue every 5 minutes as needed for Chest pain up to max of 3 total doses. If no relief after 1 dose, call 911. Historical Provider, MD   Magnesium 400 MG TABS Take 1 tablet by mouth daily    Historical Provider, MD   Bioflavonoid Products (BIOFLEX PO) Take 1,500 mg by mouth daily    Historical Provider, MD   omeprazole (PRILOSEC) 20 MG capsule Take 20 mg by mouth daily    Historical Provider, MD   exenatide (BYDUREON) 2 MG SUSR injection Inject 2 mg into the skin once a week    Historical Provider, MD   aspirin 81 MG tablet Take 81 mg by mouth daily. Historical Provider, MD   multivitamin [facility]) per tablet Take 1 tablet by mouth daily.       Historical Provider, MD       Current medications:    Current Facility-Administered Medications   Medication Dose Route Frequency Provider Last Rate Last Admin    0.9 % sodium chloride infusion   Intravenous Continuous Dank Arita MD HGB, HCT, MCV, RDW, PLT    CMP: No results found for: NA, K, CL, CO2, BUN, CREATININE, GFRAA, AGRATIO, LABGLOM, GLUCOSE, PROT, CALCIUM, BILITOT, ALKPHOS, AST, ALT    POC Tests: No results for input(s): POCGLU, POCNA, POCK, POCCL, POCBUN, POCHEMO, POCHCT in the last 72 hours. Coags: No results found for: PROTIME, INR, APTT    HCG (If Applicable): No results found for: PREGTESTUR, PREGSERUM, HCG, HCGQUANT     ABGs: No results found for: PHART, PO2ART, YDT7SJE, IAM1TFO, BEART, M1ZTLQWN     Type & Screen (If Applicable):  No results found for: LABABO, LABRH    Drug/Infectious Status (If Applicable):  No results found for: HIV, HEPCAB    COVID-19 Screening (If Applicable):   Lab Results   Component Value Date    COVID19 Not Detected 03/26/2021           Anesthesia Evaluation  Patient summary reviewed and Nursing notes reviewed  Airway: Mallampati: II  TM distance: >3 FB   Neck ROM: full  Mouth opening: < 3 FB Dental: normal exam         Pulmonary:normal exam                              ROS comment: Former smoker- quit 1976   Cardiovascular:    (+) hypertension:, CABG/stent (CABG 2010):, hyperlipidemia         Beta Blocker:  Dose within 24 Hrs         Neuro/Psych:   Negative Neuro/Psych ROS              GI/Hepatic/Renal:   (+) bowel prep,          ROS comment: BMI 32. Endo/Other:    (+) Diabetes, . Abdominal:           Vascular: negative vascular ROS. Anesthesia Plan      general and TIVA     ASA 3       Induction: intravenous. Anesthetic plan and risks discussed with patient.                     KATI Blakely - CRNA   3/29/2021

## 2021-03-29 NOTE — H&P
Patient Name: Nakia Wolf  : 1950  MRN: [id number]  DATE: 21    Allergies: Allergies   Allergen Reactions    Latex Hives     blisters        ENDOSCOPY  History and Physical    Procedure:    [] Diagnostic Colonoscopy       [x] Screening Colonoscopy  [] EGD      [] ERCP      [] EUS       [] Other    [x] Previous office notes/History and Physical reviewed from the patients chart. Please see EMR for further details of HPI. I have examined the patient's status immediately prior to the procedure and:      Indications/HPI:       [x] Screening              [x] History of Polyps      []Fhx of colon CA/polyps       Anesthesia:   [x] MAC [] Moderate Sedation   [] General   [] None     ROS: 12 pt review of systems was negative unless stated above    Medications:   Prior to Admission medications    Medication Sig Start Date End Date Taking? Authorizing Provider   insulin glargine (BASAGLAR KWIKPEN) 100 UNIT/ML injection pen Inject 40 Units into the skin nightly   Yes Historical Provider, MD   tamsulosin (FLOMAX) 0.4 MG capsule Take 0.4 mg by mouth daily   Yes Historical Provider, MD   Rosuvastatin Calcium 10 MG CPSP Take 1 capsule by mouth nightly   Yes Historical Provider, MD   Magnesium 400 MG TABS Take 1 tablet by mouth daily   Yes Historical Provider, MD   Bioflavonoid Products (BIOFLEX PO) Take 1,500 mg by mouth daily   Yes Historical Provider, MD   omeprazole (PRILOSEC) 20 MG capsule Take 20 mg by mouth daily   Yes Historical Provider, MD   verapamil (CALAN) 120 MG tablet Take 120 mg by mouth daily   Yes Historical Provider, MD   exenatide (BYDUREON) 2 MG SUSR injection Inject 2 mg into the skin once a week   Yes Historical Provider, MD   Metoprolol Tartrate (LOPRESSOR PO) Take 25 mg by mouth daily Indications: half daily    Yes Historical Provider, MD   aspirin 81 MG tablet Take 81 mg by mouth daily.      Yes Historical Provider, MD   multivitamin [facility]) per tablet Take 1 tablet by mouth this documentation as scribed by Noel Montana, and it appears accurate and complete.      Gregory Wheeler MD  3/29/2021

## 2021-03-29 NOTE — OP NOTE
Patient: Maggie Hannah : 1950  Med Rec#: [id number] Acc#: [id number]   Primary Care Provider Frankey Cooler    Date of Procedure:  3/29/2021    Endoscopist: Zina Hodgkins, MD    Referring Provider: KATI Hartman    Operation Performed:   Colonoscopy with hot snare polypectomy  Colonoscopy with submucosal injection    Indications: Screening- hx of polyps    Anesthesia:  Sedation was administered by anesthesia who monitored the patient during the procedure. I met with Maggie Hannah prior to procedure. We discussed the procedure itself, and I have discussed the risks of endoscopy (including-- but not limited to-- pain, discomfort, bleeding potentially requiring second endoscopic procedure and/or blood transfusion, organ perforation requiring operative repair, damage to organs near the colon, infection, aspiration, cardiopulmonary/allergic reaction), benefits, indications to endoscopy. Additionally, we discussed options other than colonoscopy. The patient expressed understanding. All questions answered. The patient decided to proceed with the procedure. Signed informed consent was placed on the chart. Blood Loss: minimal    Withdrawal time: > 6 min  Bowel Prep: adequate     Complications: no immediate complications    DESCRIPTION OF PROCEDURE:     A time out was performed. After written informed consent was obtained, the patient was placed in the left lateral position. The perianal area was inspected, and a digital rectal exam was performed. A rectal exam was performed: normal tone, no palpable lesions. At this point, a forward viewing Olympus colonoscope was inserted into the anus and carefully advanced to the cecum. The cecum was identified by the ileocecal valve and the appendiceal orifice. The colonoscope was then slowly withdrawn with careful inspection of the mucosa in a linear and circumferential fashion. The scope was retroflexed in the rectum.  Suction was utilized during the procedure to remove as much air as possible from the bowel. The colonoscope was removed from the patient, and the procedure was terminated. Findings are listed below. Findings: The mucosa appeared normal throughout the entire examined colon. In the right colon there was a lipoma. In the rectum, a 13 mm, broad-based, sessile polyp was removed completely with hot snare polypectomy. Tattoo was placed at the resection site. There was evidence of diverticular disease throughout the left colon. Retroflexion in the rectum was normal and revealed no further abnormalities. Recommendations:  1. Repeat colonoscopy: pending pathology - max of 1 year. 2. Await biopsy results-you will receive a letter with your results. Findings and recommendations were discussed w/ the patient. A copy of the images was provided. Kishan Birmingham am scribing for and in the presence of Dr. Noris Pritchard MD.  Electronically signed by Luisa Young RN on 3/29/2021 at 9:25 AM    I personally performed the services described in this documentation as scribed by Bety Harper, and it appears accurate and complete.      Noris Pritchard MD  3/29/2021

## 2021-03-29 NOTE — PROGRESS NOTES
IN TO SEE PT/FAMILY TO DISCUSS RESULTS OF PROCEDURE. PT AND FAMILY VERBALIZED UNDERSTANDING OF DISCHARGE INSTRUCTIONS.

## 2021-05-10 ENCOUNTER — OFFICE VISIT (OUTPATIENT)
Dept: CARDIOLOGY | Facility: CLINIC | Age: 71
End: 2021-05-10

## 2021-05-10 VITALS
BODY MASS INDEX: 27.48 KG/M2 | HEART RATE: 57 BPM | OXYGEN SATURATION: 98 % | DIASTOLIC BLOOD PRESSURE: 70 MMHG | WEIGHT: 171 LBS | SYSTOLIC BLOOD PRESSURE: 138 MMHG | HEIGHT: 66 IN

## 2021-05-10 DIAGNOSIS — I25.10 CORONARY ARTERY DISEASE INVOLVING NATIVE HEART WITHOUT ANGINA PECTORIS, UNSPECIFIED VESSEL OR LESION TYPE: Primary | ICD-10-CM

## 2021-05-10 DIAGNOSIS — E11.59 TYPE 2 DIABETES MELLITUS WITH OTHER CIRCULATORY COMPLICATION, WITH LONG-TERM CURRENT USE OF INSULIN (HCC): ICD-10-CM

## 2021-05-10 DIAGNOSIS — I10 ESSENTIAL HYPERTENSION: ICD-10-CM

## 2021-05-10 DIAGNOSIS — E78.2 MIXED HYPERLIPIDEMIA: ICD-10-CM

## 2021-05-10 DIAGNOSIS — Z79.4 TYPE 2 DIABETES MELLITUS WITH OTHER CIRCULATORY COMPLICATION, WITH LONG-TERM CURRENT USE OF INSULIN (HCC): ICD-10-CM

## 2021-05-10 PROCEDURE — 99213 OFFICE O/P EST LOW 20 MIN: CPT | Performed by: NURSE PRACTITIONER

## 2021-05-10 PROCEDURE — 93000 ELECTROCARDIOGRAM COMPLETE: CPT | Performed by: NURSE PRACTITIONER

## 2021-05-10 RX ORDER — RUTIN/HESP/BIOFLAV/C/HERBAL196 40-25-50MG
1500 TABLET ORAL DAILY
COMMUNITY

## 2021-05-10 NOTE — PROGRESS NOTES
Subjective:     Encounter Date:05/10/2021      Patient ID: Sheeba Saunders is a 71 y.o. male.    Chief Complaint: Follow-up CAD    The patient presents to follow-up regarding his history of coronary artery disease status post three-vessel CABG in 2010.  At his visit in February 2017 an attempt was made to get him on a PCSK9 inhibitor for hyperlipidemia since he had previously been intolerant to Crestor and Vytorin.  He did receive 2 injections, but later his insurance coverage was not sufficient and he was left with too much out-of-pocket cost, resulting in discontinuation of the medication.  His PCP later put him on low-dose Crestor and he was doing well with this.  He last saw Dr. Bennett in February 2018 and he was without complaint that day.  He had walked 1 mile the day before without any chest pain or shortness of breath.  No changes were made to medical therapy.  He followed up again in early 2019 and was without complaint-no changes were made, and he followed up in the spring 2020-telephone visit at that time.  He reported he was mowing a few yards per week and was not having any symptoms.  No changes were made.    Today the patient reports he continues to do well overall.  He denies any declines in his stamina.  He denies chest pain, shortness of breath, edema, orthopnea, PND, palpitations, syncope or presyncope.  He reports compliance with his medications and good blood pressure control.  His A1c is 7.95, but he states his more recent blood glucose levels are reflective of better control than this-he states they have been 110s to 130s.      The following portions of the patient's history were reviewed and updated as appropriate: allergies, current medications, past family history, past medical history, past social history, past surgical history and problem list.    Review of Systems   Constitutional: Negative for malaise/fatigue.   Cardiovascular: Negative for chest pain, claudication, dyspnea on  "exertion, leg swelling, near-syncope, orthopnea, palpitations, paroxysmal nocturnal dyspnea and syncope.   Respiratory: Negative for cough and shortness of breath.    Hematologic/Lymphatic: Does not bruise/bleed easily.   Musculoskeletal: Negative for falls.   Gastrointestinal: Negative for bloating.   Neurological: Negative for dizziness, light-headedness and weakness.       Allergies   Allergen Reactions   • Dapagliflozin Other (See Comments)   • Latex Hives     blisters         Current Outpatient Medications:   •  aspirin 81 MG EC tablet, Take 81 mg by mouth Daily., Disp: , Rfl:   •  Bioflavonoid Products (Bioflex) tablet, Take 1,500 mg by mouth Daily., Disp: , Rfl:   •  exenatide er (BYDUREON) 2 MG pen-injector injection, Inject 2 mg under the skin into the appropriate area as directed 1 (One) Time Per Week., Disp: , Rfl:   •  Insulin Glargine (BASAGLAR KWIKPEN) 100 UNIT/ML injection pen, Inject 40 Units under the skin into the appropriate area as directed Daily., Disp: , Rfl:   •  magnesium oxide (MAG-OX) 400 MG tablet, Take 400 mg by mouth Daily., Disp: , Rfl:   •  metoprolol tartrate (LOPRESSOR) 25 MG tablet, Take 25 mg by mouth 2 (Two) Times a Day., Disp: , Rfl:   •  Multiple Vitamin (MULTIVITAMINS PO), Take 1 tablet by mouth Daily., Disp: , Rfl:   •  nitroglycerin (NITROSTAT) 0.4 MG SL tablet, Place 0.4 mg under the tongue Every 5 (Five) Minutes As Needed for chest pain. Take no more than 3 doses in 15 minutes., Disp: , Rfl:   •  omeprazole (priLOSEC) 20 MG capsule, Take 20 mg by mouth Daily., Disp: , Rfl:   •  rosuvastatin (CRESTOR) 10 MG tablet, Take 10 mg by mouth Daily., Disp: , Rfl:   •  tamsulosin (FLOMAX) 0.4 MG capsule 24 hr capsule, Take 1 capsule by mouth Every Night., Disp: , Rfl:   •  verapamil (CALAN) 120 MG tablet, Take 120 mg by mouth Daily., Disp: , Rfl:          Objective:    /70   Pulse 57   Ht 167.6 cm (66\")   Wt 77.6 kg (171 lb)   SpO2 98%   BMI 27.60 kg/m²        Vitals and " nursing note reviewed.   Constitutional:       General: Not in acute distress.     Appearance: Well-developed and not in distress. Not diaphoretic.   Neck:      Vascular: No JVD.   Pulmonary:      Effort: Pulmonary effort is normal. No respiratory distress.      Breath sounds: Normal breath sounds.   Cardiovascular:      Normal rate. Regular rhythm.      Murmurs: There is no murmur.   Edema:     Peripheral edema absent.   Abdominal:      Tenderness: There is no abdominal tenderness.   Skin:     General: Skin is warm and dry.   Neurological:      Mental Status: Alert and oriented to person, place, and time.         Lab Review:            ECG 12 Lead    Date/Time: 5/10/2021 3:38 PM  Performed by: Sonia Kramer APRN  Authorized by: Sonia Kramer APRN   Comparison: compared with previous ECG from 3/26/2019  Similar to previous ECG  Rhythm: sinus bradycardia and sinus arrhythmia  BPM: 54  Other findings: non-specific ST-T wave changes            Results for orders placed in visit on 07/27/11    SCANNED - ECHOCARDIOGRAM      Assessment:      Problem List Items Addressed This Visit        Cardiac and Vasculature    HLD (hyperlipidemia)    CAD (coronary artery disease) - Primary    Overview     3v CABG '10 (LIMA-LAD, SVG-D1, SVG-PDA) with subsequent low-risk stress echo in '11)         HTN (hypertension)       Endocrine and Metabolic    DM (diabetes mellitus) (CMS/[facility])          Plan:     1.  Coronary artery disease: Established problem, stable.  No angina.  -Continue aspirin 81 mg daily indefinitely  -Continue Crestor 10 mg nightly-notes indicate he was previously intolerant to higher doses  -Continue current doses of beta-blocker and calcium channel blocker  -Has not had to use as needed sublingual nitroglycerin    2.  Essential hypertension: Established problem, stable.  Well-controlled.  Continue current doses of Lopressor and verapamil.    3.  Mixed hyperlipidemia: Established problem, stable.  LDL was controlled at 51  per May 2020 labs.  We discussed the goal of keeping the LDL less than 70.  More recent lipid panel not currently available to me.  -Continue Crestor 10 mg nightly-intolerant to higher doses; no longer on PCSK9 inhibitor due to high out-of-pocket cost    4.  Diabetes mellitus type 2: Established problem, stable.  A1c earlier this month 7.95.  He is on Bydureon and insulin.  He reports his most recent blood glucose levels have been around 110s to 130s.

## 2022-02-07 ENCOUNTER — TELEPHONE (OUTPATIENT)
Dept: GASTROENTEROLOGY | Age: 72
End: 2022-02-07

## 2022-02-07 NOTE — TELEPHONE ENCOUNTER
Dana Raymundo wife April called in regards to recall letter. Please reach out anytime @ [phone]. Thank you.

## 2022-02-11 ENCOUNTER — TELEPHONE (OUTPATIENT)
Dept: GASTROENTEROLOGY | Age: 72
End: 2022-02-11

## 2022-02-11 DIAGNOSIS — Z11.52 ENCOUNTER FOR SCREENING FOR COVID-19: Primary | ICD-10-CM

## 2022-02-11 NOTE — TELEPHONE ENCOUNTER
Patient calling to set up colonoscopy. Stated they have called a few times with no return call. Please reach out at your convenience.

## 2022-03-25 DIAGNOSIS — Z11.52 ENCOUNTER FOR SCREENING FOR COVID-19: ICD-10-CM

## 2022-03-25 LAB — SARS-COV-2, PCR: NOT DETECTED

## 2022-03-28 ENCOUNTER — HOSPITAL ENCOUNTER (OUTPATIENT)
Age: 72
Setting detail: OUTPATIENT SURGERY
Discharge: HOME | End: 2022-03-28
Attending: INTERNAL MEDICINE | Admitting: INTERNAL MEDICINE
Payer: MEDICARE

## 2022-03-28 ENCOUNTER — HOSPITAL ENCOUNTER (OUTPATIENT)
Dept: OPERATING ROOM | Age: 72
Setting detail: OUTPATIENT SURGERY
Discharge: HOME | End: 2022-03-29

## 2022-03-28 ENCOUNTER — ANESTHESIA EVENT (OUTPATIENT)
Dept: OPERATING ROOM | Age: 72
End: 2022-03-28

## 2022-03-28 ENCOUNTER — ANESTHESIA (OUTPATIENT)
Dept: OPERATING ROOM | Age: 72
End: 2022-03-28

## 2022-03-28 VITALS — SYSTOLIC BLOOD PRESSURE: 142 MMHG | OXYGEN SATURATION: 99 % | DIASTOLIC BLOOD PRESSURE: 69 MMHG

## 2022-03-28 VITALS
BODY MASS INDEX: 29.89 KG/M2 | TEMPERATURE: 97.1 F | DIASTOLIC BLOOD PRESSURE: 77 MMHG | HEART RATE: 55 BPM | HEIGHT: 66 IN | OXYGEN SATURATION: 98 % | SYSTOLIC BLOOD PRESSURE: 166 MMHG | RESPIRATION RATE: 20 BRPM | WEIGHT: 186 LBS

## 2022-03-28 PROCEDURE — G0105 COLORECTAL SCRN; HI RISK IND: HCPCS | Performed by: INTERNAL MEDICINE

## 2022-03-28 PROCEDURE — G0105 COLORECTAL SCRN; HI RISK IND: HCPCS

## 2022-03-28 RX ORDER — PROPOFOL 10 MG/ML
INJECTION, EMULSION INTRAVENOUS PRN
Status: DISCONTINUED | OUTPATIENT
Start: 2022-03-28 | End: 2022-03-28 | Stop reason: SDUPTHER

## 2022-03-28 RX ORDER — LIDOCAINE HYDROCHLORIDE 10 MG/ML
INJECTION, SOLUTION INFILTRATION; PERINEURAL PRN
Status: DISCONTINUED | OUTPATIENT
Start: 2022-03-28 | End: 2022-03-28 | Stop reason: SDUPTHER

## 2022-03-28 RX ORDER — SODIUM CHLORIDE 9 MG/ML
INJECTION, SOLUTION INTRAVENOUS CONTINUOUS
Status: DISCONTINUED | OUTPATIENT
Start: 2022-03-28 | End: 2022-03-28 | Stop reason: HOSPADM

## 2022-03-28 RX ADMIN — LIDOCAINE HYDROCHLORIDE 40 MG: 10 INJECTION, SOLUTION INFILTRATION; PERINEURAL at 10:24

## 2022-03-28 RX ADMIN — PROPOFOL 150 MG: 10 INJECTION, EMULSION INTRAVENOUS at 10:24

## 2022-03-28 RX ADMIN — SODIUM CHLORIDE: 9 INJECTION, SOLUTION INTRAVENOUS at 10:17

## 2022-03-28 NOTE — ANESTHESIA POSTPROCEDURE EVALUATION
Department of Anesthesiology  Postprocedure Note    Patient: Kee Smart  MRN: [id number]  YOB: 1950  Date of evaluation: 3/28/2022  Time:  10:44 AM     Procedure Summary     Date: 03/28/22 Room / Location: [facility] ENDO 01 / [address]    Anesthesia Start: 9542 Anesthesia Stop:     Procedure: [address], NOT HIGH RISK (N/A Abdomen) Diagnosis: (SCREEN, HX POLYPS)    Surgeons: Cecilio Oconnor MD Responsible Provider: KATI Daly CRNA    Anesthesia Type: general, TIVA ASA Status: 3          Anesthesia Type: No value filed. Joelle Phase I:      Joelle Phase II:      Last vitals: Reviewed and per EMR flowsheets.        Anesthesia Post Evaluation    Patient location during evaluation: bedside  Patient participation: complete - patient participated  Level of consciousness: sleepy but conscious  Pain score: 0  Airway patency: patent  Nausea & Vomiting: no nausea and no vomiting  Complications: no  Cardiovascular status: hemodynamically stable and blood pressure returned to baseline  Respiratory status: acceptable and nasal cannula  Hydration status: stable

## 2022-03-28 NOTE — ANESTHESIA PRE PROCEDURE
Department of Anesthesiology  Preprocedure Note       Name:  Angel Etienne   Age:  67 y.o.  :  1950                                          MRN:  [id number]         Date:  3/28/2022      Surgeon: Luke Patino):  Evangelist Thomas MD    Procedure: Procedure(s):  COLORECTAL CANCER SCREENING, NOT HIGH RISK    Medications prior to admission:   Prior to Admission medications    Medication Sig Start Date End Date Taking? Authorizing Provider   insulin glargine (BASAGLAR KWIKPEN) 100 UNIT/ML injection pen Inject 40 Units into the skin nightly    Historical Provider, MD   tamsulosin (FLOMAX) 0.4 MG capsule Take 0.4 mg by mouth daily    Historical Provider, MD   Rosuvastatin Calcium 10 MG CPSP Take 1 capsule by mouth nightly    Historical Provider, MD   nitroGLYCERIN (NITROSTAT) 0.4 MG SL tablet Place 0.4 mg under the tongue every 5 minutes as needed for Chest pain up to max of 3 total doses. If no relief after 1 dose, call 911. Historical Provider, MD   Magnesium 400 MG TABS Take 1 tablet by mouth daily    Historical Provider, MD   Bioflavonoid Products (BIOFLEX PO) Take 1,500 mg by mouth daily    Historical Provider, MD   omeprazole (PRILOSEC) 20 MG capsule Take 20 mg by mouth daily    Historical Provider, MD   verapamil (CALAN) 120 MG tablet Take 120 mg by mouth daily    Historical Provider, MD   exenatide (BYDUREON) 2 MG SUSR injection Inject 2 mg into the skin once a week    Historical Provider, MD   Metoprolol Tartrate (LOPRESSOR PO) Take 25 mg by mouth daily Indications: half daily     Historical Provider, MD   aspirin 81 MG tablet Take 81 mg by mouth daily. Historical Provider, MD   multivitamin [facility]) per tablet Take 1 tablet by mouth daily. Historical Provider, MD       Current medications:    No current facility-administered medications for this visit. No current outpatient medications on file.      Facility-Administered Medications Ordered in Other Visits   Medication Dose Route Frequency Provider Last Rate Last Admin    0.9 % sodium chloride infusion   IntraVENous Continuous Sami Lester MD           Allergies: Allergies   Allergen Reactions    Latex Hives     blisters       Problem List:    Patient Active Problem List   Diagnosis Code    History of colon polyps Z86.010    Reflux [id number]    Diverticulosis K57.90    Family history of gastric cancer Z80.0    Diabetes mellitus ([facility] [address] E11.9    HTN (hypertension) I10    Hyperlipidemia E78.5    Colon polyps K63.5       Past Medical History:        Diagnosis Date    History of adenomatous polyp of colon     Hyperlipidemia     Hypertension     Type II or unspecified type diabetes mellitus without mention of complication, not stated as uncontrolled        Past Surgical History:        Procedure Laterality Date    COLONOSCOPY  2009    Dr Kela Ganser    COLONOSCOPY  2013    tubulovillous adenoma of cecum    COLONOSCOPY  2016    Dr GRETA Fulton-diverticular disease, 5 yr recall    COLONOSCOPY N/A 2021    Dr Sky Fulton-w/tattoo placement-Diverticular disease-AP x 1, 1 yr recall    COLONOSCOPY  2021    Dr Sky Fulton-w/tattoo placement-Diverticular disease-AP x 1, 1 yr recall    CORONARY ARTERY BYPASS GRAFT         Social History:    Social History     Tobacco Use    Smoking status: Former Smoker     Packs/day: 0.00     Years: 0.00     Pack years: 0.00     Types: Cigarettes     Quit date: 1976     Years since quittin.3    Smokeless tobacco: Never Used   Substance Use Topics    Alcohol use: No                                Counseling given: Not Answered      Vital Signs (Current): There were no vitals filed for this visit.                                            BP Readings from Last 3 Encounters:   21 (!) 159/73   21 (!) 140/70   03/10/21 120/70       NPO Status:                                                                                 BMI:   Wt Readings from Last 3 Encounters:   21 194 lb (88 kg)   03/10/21 201 lb 12.8 oz (91.5 kg)   03/11/16 190 lb (86.2 kg)     There is no height or weight on file to calculate BMI.    CBC: No results found for: WBC, RBC, HGB, HCT, MCV, RDW, PLT    CMP: No results found for: NA, K, CL, CO2, BUN, CREATININE, GFRAA, AGRATIO, LABGLOM, GLUCOSE, GLU, PROT, CALCIUM, BILITOT, ALKPHOS, AST, ALT    POC Tests: No results for input(s): POCGLU, POCNA, POCK, POCCL, POCBUN, POCHEMO, POCHCT in the last 72 hours. Coags: No results found for: PROTIME, INR, APTT    HCG (If Applicable): No results found for: PREGTESTUR, PREGSERUM, HCG, HCGQUANT     ABGs: No results found for: PHART, PO2ART, ADR3AGF, HAT4ZCQ, BEART, F7FGIFAK     Type & Screen (If Applicable):  No results found for: LABABO, LABRH    Drug/Infectious Status (If Applicable):  No results found for: HIV, HEPCAB    COVID-19 Screening (If Applicable):   Lab Results   Component Value Date    COVID19 Not Detected 03/25/2022           Anesthesia Evaluation  Patient summary reviewed and Nursing notes reviewed no history of anesthetic complications:   Airway: Mallampati: II  TM distance: >3 FB   Neck ROM: full  Mouth opening: < 3 FB Dental: normal exam         Pulmonary:normal exam                              ROS comment: Former smoker- quit 1976   Cardiovascular:  Exercise tolerance: good (>4 METS),   (+) hypertension:, CABG/stent (CABG 2010):, hyperlipidemia         Beta Blocker:  Dose within 24 Hrs         Neuro/Psych:   Negative Neuro/Psych ROS              GI/Hepatic/Renal:   (+) bowel prep,          ROS comment: BMI 32. Endo/Other:    (+) Diabetes, . Abdominal:             Vascular: negative vascular ROS. Other Findings:               Anesthesia Plan      general and TIVA     ASA 3       Induction: intravenous. Anesthetic plan and risks discussed with patient.                       KATI Witt - CRNA   3/28/2022

## 2022-03-28 NOTE — H&P
Patient Name: George Lee  : 1950  MRN: [id number]  DATE: 22    Allergies: Allergies   Allergen Reactions    Latex Hives     blisters        ENDOSCOPY  History and Physical    Procedure:    [] Diagnostic Colonoscopy       [x] Screening Colonoscopy  [] EGD      [] ERCP      [] EUS       [] Other    [x] Previous office notes/History and Physical reviewed from the patients chart. Please see EMR for further details of HPI. I have examined the patient's status immediately prior to the procedure and:      Indications/HPI:       [x] Screening              [x] History of Polyps      []Fhx of colon CA/polyps []+Cologard/DNA/Stool Testing      Anesthesia:   [x] MAC [] Moderate Sedation   [] General   [] None     ROS: 12 pt review of systems was negative unless stated above    Medications:   Prior to Admission medications    Medication Sig Start Date End Date Taking? Authorizing Provider   insulin glargine (BASAGLAR KWIKPEN) 100 UNIT/ML injection pen Inject 40 Units into the skin nightly    Historical Provider, MD   tamsulosin (FLOMAX) 0.4 MG capsule Take 0.4 mg by mouth daily    Historical Provider, MD   Rosuvastatin Calcium 10 MG CPSP Take 1 capsule by mouth nightly    Historical Provider, MD   nitroGLYCERIN (NITROSTAT) 0.4 MG SL tablet Place 0.4 mg under the tongue every 5 minutes as needed for Chest pain up to max of 3 total doses. If no relief after 1 dose, call 911.     Historical Provider, MD   Magnesium 400 MG TABS Take 1 tablet by mouth daily    Historical Provider, MD   Bioflavonoid Products (BIOFLEX PO) Take 1,500 mg by mouth daily    Historical Provider, MD   omeprazole (PRILOSEC) 20 MG capsule Take 20 mg by mouth daily    Historical Provider, MD   verapamil (CALAN) 120 MG tablet Take 120 mg by mouth daily    Historical Provider, MD   exenatide (BYDUREON) 2 MG SUSR injection Inject 2 mg into the skin once a week    Historical Provider, MD   Metoprolol Tartrate (LOPRESSOR PO) Take 25 mg by mouth daily Indications: half daily     Historical Provider, MD   aspirin 81 MG tablet Take 81 mg by mouth daily. Historical Provider, MD   multivitamin [facility]) per tablet Take 1 tablet by mouth daily. Historical Provider, MD       Past Medical History:  Past Medical History:   Diagnosis Date    History of adenomatous polyp of colon     Hyperlipidemia     Hypertension     Type II or unspecified type diabetes mellitus without mention of complication, not stated as uncontrolled        Past Surgical History:  Past Surgical History:   Procedure Laterality Date    COLONOSCOPY  2009    Dr Jamari Roberts    COLONOSCOPY  2013    tubulovillous adenoma of cecum    COLONOSCOPY  2016    Dr GRETA Fulton-diverticular disease, 5 yr recall    COLONOSCOPY N/A 2021    Dr Ronaldo Fulton-w/tattoo placement-Diverticular disease-AP x 1, 1 yr recall    COLONOSCOPY  2021    Dr Ronaldo Fulton-w/tattoo placement-Diverticular disease-AP x 1, 1 yr recall    CORONARY ARTERY BYPASS GRAFT         Social History:  Social History     Tobacco Use    Smoking status: Former Smoker     Packs/day: 0.00     Years: 0.00     Pack years: 0.00     Types: Cigarettes     Quit date: 1976     Years since quittin.3    Smokeless tobacco: Never Used   Vaping Use    Vaping Use: Never used   Substance Use Topics    Alcohol use: No    Drug use: No       Vital Signs: There were no vitals filed for this visit. Physical Exam:  Cardiac:  [x]WNL []Comments:  Pulmonary:  [x]WNL   []Comments:  Neuro/Mental Status:  [x]WNL  []Comments:  Abdominal:  [x]WNL    []Comments:  Other:   []WNL  []Comments:    Informed Consent:  The risks and benefits of the procedure have been discussed with either the patient or if they cannot consent, their representative. Assessment:  Patient examined and appropriate for planned sedation and procedure. Plan:  Proceed with planned sedation and procedure as above.     Nicholas Carroll, am scribing for and in the presence of Dr. Rigo Ball MD.  Electronically signed by Lorene Enriquez RN on 3/28/2022 at 10:05 AM    I personally performed the services described in this documentation as scribed by Bar Christensen, and it appears accurate and complete.      Rigo Ball MD  3/28/2022

## 2022-03-28 NOTE — OP NOTE
Patient: Varghese Urena : 1950  Med Rec#: [id number] Acc#: [id number]   Primary Care Provider Serjio Jones    Date of Procedure:  3/28/2022    Endoscopist: Chalo Mari MD    Referring Provider: Serjio Jones    Operation Performed: Colonoscopy     Indications: Screening- hx of polyps    Anesthesia:  Sedation was administered by anesthesia who monitored the patient during the procedure. I met with Varghese Urena prior to procedure. We discussed the procedure itself, and I have discussed the risks of endoscopy (including-- but not limited to-- pain, discomfort, bleeding potentially requiring second endoscopic procedure and/or blood transfusion, organ perforation requiring operative repair, damage to organs near the colon, infection, aspiration, cardiopulmonary/allergic reaction), benefits, indications to endoscopy. Additionally, we discussed options other than colonoscopy. The patient expressed understanding. All questions answered. The patient decided to proceed with the procedure. Signed informed consent was placed on the chart. Blood Loss: minimal    Withdrawal time: > 6 min  Bowel Prep: adequate     Complications: no immediate complications    DESCRIPTION OF PROCEDURE:     A time out was performed. After written informed consent was obtained, the patient was placed in the left lateral position. The perianal area was inspected, and a digital rectal exam was performed. A rectal exam was performed: normal tone, no palpable lesions. At this point, a forward viewing Olympus colonoscope was inserted into the anus and carefully advanced to the cecum. The cecum was identified by the ileocecal valve and the appendiceal orifice. The colonoscope was then slowly withdrawn with careful inspection of the mucosa in a linear and circumferential fashion. The scope was retroflexed in the rectum. Suction was utilized during the procedure to remove as much air as possible from the bowel.  The colonoscope was removed from the patient, and the procedure was terminated. Findings are listed below. Findings: The mucosa appeared normal throughout the entire examined colon. There is a lipoma in the right colon. There was evidence of diverticular disease throughout the left colon. There is tattoo in the rectum, no residual polyp/lesion seen. Retroflexion in the rectum was normal and revealed no further abnormalities. Recommendations:  1. Repeat colonoscopy: 5 years    Findings and recommendations were discussed w/ the patient. A copy of the images was provided. Mayda Worley am scribing for and in the presence of Dr. Marie Ferris MD.  Electronically signed by Wesly Harrington RN on 3/28/2022 at 10:05 AM    I personally performed the services described in this documentation as scribed by Rj Aleman, and it appears accurate and complete.      Marie Ferris MD  3/28/2022

## 2022-05-11 ENCOUNTER — OFFICE VISIT (OUTPATIENT)
Dept: CARDIOLOGY | Facility: CLINIC | Age: 72
End: 2022-05-11

## 2022-05-11 VITALS
DIASTOLIC BLOOD PRESSURE: 62 MMHG | SYSTOLIC BLOOD PRESSURE: 136 MMHG | WEIGHT: 168 LBS | HEART RATE: 66 BPM | BODY MASS INDEX: 27 KG/M2 | HEIGHT: 66 IN | OXYGEN SATURATION: 95 %

## 2022-05-11 DIAGNOSIS — I10 PRIMARY HYPERTENSION: ICD-10-CM

## 2022-05-11 DIAGNOSIS — E78.2 MIXED HYPERLIPIDEMIA: ICD-10-CM

## 2022-05-11 DIAGNOSIS — E11.59 TYPE 2 DIABETES MELLITUS WITH OTHER CIRCULATORY COMPLICATION, WITH LONG-TERM CURRENT USE OF INSULIN: ICD-10-CM

## 2022-05-11 DIAGNOSIS — Z79.4 TYPE 2 DIABETES MELLITUS WITH OTHER CIRCULATORY COMPLICATION, WITH LONG-TERM CURRENT USE OF INSULIN: ICD-10-CM

## 2022-05-11 DIAGNOSIS — I25.10 CORONARY ARTERY DISEASE INVOLVING NATIVE HEART WITHOUT ANGINA PECTORIS, UNSPECIFIED VESSEL OR LESION TYPE: Primary | ICD-10-CM

## 2022-05-11 PROCEDURE — 99214 OFFICE O/P EST MOD 30 MIN: CPT | Performed by: NURSE PRACTITIONER

## 2022-05-11 PROCEDURE — 93000 ELECTROCARDIOGRAM COMPLETE: CPT | Performed by: NURSE PRACTITIONER

## 2022-05-11 NOTE — PROGRESS NOTES
Subjective:     Encounter Date:05/11/2022      Patient ID: Sheeba Saunders is a 72 y.o. male.    Chief Complaint: Follow-up CAD    History of Present Illness     The patient presents to follow-up regarding his history of coronary artery disease status post three-vessel CABG in 2010.  At his visit in February 2017 an attempt was made to get him on a PCSK9 inhibitor for hyperlipidemia since he had previously been intolerant to Crestor and Vytorin.  He did receive 2 injections, but later his insurance coverage was not sufficient and he was left with too much out-of-pocket cost, resulting in discontinuation of the medication.  His PCP later put him on low-dose Crestor and he was doing well with this.      He was doing well at each of his visits in 2018, 2019 and 2020-remaining active without any symptoms whatsoever.    I saw the patient in 2021 and he continued to do well.  He denied any decline in his stamina, chest pain, shortness of breath or palpitations.  He reported compliance with his medications and good blood pressure control.  He reported his most recent blood glucose levels reflected pretty good control as well, although his A1c was near 8.  No changes were made at that visit.    Today the patient states he continues to do very well overall.  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  Now that the weather has improved, he has resumed walking 1 mile per day over the past week without any symptoms.  Specifically he denies any chest pain, shortness of breath or palpitations.  He reports compliance with his medications and good blood pressure control.  He is due for his routine yearly labs with his PCP next month.    The following portions of the patient's history were reviewed and updated as appropriate: allergies, current medications, past family history, past medical history, past social history, past surgical history and problem list.    Review of Systems   Constitutional:  Negative for malaise/fatigue.   Cardiovascular: Negative for chest pain, claudication, dyspnea on exertion, leg swelling, near-syncope, orthopnea, palpitations, paroxysmal nocturnal dyspnea and syncope.   Respiratory: Negative for cough and shortness of breath.    Hematologic/Lymphatic: Does not bruise/bleed easily.   Musculoskeletal: Negative for falls.   Gastrointestinal: Negative for bloating.   Neurological: Negative for dizziness, light-headedness and weakness.       Allergies   Allergen Reactions   • Dapagliflozin Other (See Comments)   • Latex Hives     blisters         Current Outpatient Medications:   •  aspirin 81 MG EC tablet, Take 81 mg by mouth Daily., Disp: , Rfl:   •  Bioflavonoid Products (Bioflex) tablet, Take 1,500 mg by mouth Daily., Disp: , Rfl:   •  exenatide er (BYDUREON) 2 MG pen-injector injection, Inject 2 mg under the skin into the appropriate area as directed 1 (One) Time Per Week., Disp: , Rfl:   •  Insulin Glargine (BASAGLAR KWIKPEN) 100 UNIT/ML injection pen, Inject 40 Units under the skin into the appropriate area as directed Daily., Disp: , Rfl:   •  magnesium oxide (MAG-OX) 400 MG tablet, Take 400 mg by mouth Daily., Disp: , Rfl:   •  metoprolol tartrate (LOPRESSOR) 25 MG tablet, Take 25 mg by mouth 2 (Two) Times a Day., Disp: , Rfl:   •  Multiple Vitamin (MULTIVITAMINS PO), Take 1 tablet by mouth Daily., Disp: , Rfl:   •  nitroglycerin (NITROSTAT) 0.4 MG SL tablet, Place 0.4 mg under the tongue Every 5 (Five) Minutes As Needed for Chest Pain. Take no more than 3 doses in 15 minutes., Disp: , Rfl:   •  omeprazole (priLOSEC) 20 MG capsule, Take 20 mg by mouth Daily., Disp: , Rfl:   •  rosuvastatin (CRESTOR) 10 MG tablet, Take 10 mg by mouth Daily., Disp: , Rfl:   •  tamsulosin (FLOMAX) 0.4 MG capsule 24 hr capsule, Take 1 capsule by mouth Every Night., Disp: , Rfl:   •  verapamil (CALAN) 120 MG tablet, Take 120 mg by mouth Daily., Disp: , Rfl:          Objective:    /62   Pulse  "66   Ht 167.6 cm (66\")   Wt 76.2 kg (168 lb)   SpO2 95%   BMI 27.12 kg/m²        Vitals and nursing note reviewed.   Constitutional:       General: Not in acute distress.     Appearance: Well-developed and not in distress. Not diaphoretic.   Neck:      Vascular: No JVD.   Pulmonary:      Effort: Pulmonary effort is normal. No respiratory distress.      Breath sounds: Normal breath sounds.   Cardiovascular:      Normal rate. Regular rhythm.      Murmurs: There is no murmur.   Edema:     Peripheral edema absent.   Abdominal:      Tenderness: There is no abdominal tenderness.   Skin:     General: Skin is warm and dry.   Neurological:      Mental Status: Alert and oriented to person, place, and time.         Lab Review:            ECG 12 Lead    Date/Time: 5/11/2022 4:57 PM  Performed by: Sonia Kramer APRN  Authorized by: Sonia Kramer APRN   Comparison: compared with previous ECG from 5/10/2021  Similar to previous ECG  Comparison to previous ECG: Nonspecific ST-T wave abnormalities-similar to previous  Rhythm: sinus bradycardia  BPM: 58              Results for orders placed in visit on 07/27/11    SCANNED - ECHOCARDIOGRAM      Assessment:      Problem List Items Addressed This Visit        Cardiac and Vasculature    HLD (hyperlipidemia)    CAD (coronary artery disease) - Primary    Overview     3v CABG '10 (LIMA-LAD, SVG-D1, SVG-PDA) with subsequent low-risk stress echo in '11)           HTN (hypertension)       Endocrine and Metabolic    DM (diabetes mellitus) (HCC)          Plan:     1.  Coronary artery disease: Established problem, stable.  No angina.  -Continue aspirin 81 mg daily indefinitely  -Continue Crestor 10 mg nightly-previously intolerant to higher doses  -Continue current doses of beta-blocker and calcium channel blocker  -Has not had to use as needed sublingual nitroglycerin    2.  Essential hypertension: Established problem, stable.  Well-controlled.  Continue current doses of Lopressor and " verapamil.    3.  Mixed hyperlipidemia: Established problem, stable.  LDL was controlled at 51 per May 2020 labs.  We discussed the goal of keeping the LDL less than 70.  More recent lipid panel not currently available to me.  He is going to follow-up with repeat labs next month per his PCP.  -Continue Crestor 10 mg nightly-intolerant to higher doses; no longer on PCSK9 inhibitor due to high out-of-pocket cost    4.  Diabetes mellitus type 2: Established problem.  Uncertain control.  Recent A1c not currently available to me.  Managed with long-acting insulin and Bydureon.  We did discuss the cardiovascular benefit of SGLT2 inhibitors, however he states he cannot take these (specifically Farxiga) due to prior genitourinary issues when taking Farxiga in the past.    Follow-up in 1 year with Dr. Bennett, sooner with symptoms or concerns.

## 2023-05-17 ENCOUNTER — OFFICE VISIT (OUTPATIENT)
Dept: CARDIOLOGY | Facility: CLINIC | Age: 73
End: 2023-05-17
Payer: MEDICARE

## 2023-05-17 VITALS
DIASTOLIC BLOOD PRESSURE: 78 MMHG | SYSTOLIC BLOOD PRESSURE: 124 MMHG | WEIGHT: 192.6 LBS | HEIGHT: 66 IN | OXYGEN SATURATION: 96 % | BODY MASS INDEX: 30.95 KG/M2 | HEART RATE: 64 BPM

## 2023-05-17 DIAGNOSIS — E11.59 TYPE 2 DIABETES MELLITUS WITH OTHER CIRCULATORY COMPLICATION, WITH LONG-TERM CURRENT USE OF INSULIN: ICD-10-CM

## 2023-05-17 DIAGNOSIS — E78.2 MIXED HYPERLIPIDEMIA: ICD-10-CM

## 2023-05-17 DIAGNOSIS — Z79.4 TYPE 2 DIABETES MELLITUS WITH OTHER CIRCULATORY COMPLICATION, WITH LONG-TERM CURRENT USE OF INSULIN: ICD-10-CM

## 2023-05-17 DIAGNOSIS — I10 PRIMARY HYPERTENSION: ICD-10-CM

## 2023-05-17 DIAGNOSIS — I25.10 CORONARY ARTERY DISEASE INVOLVING NATIVE HEART WITHOUT ANGINA PECTORIS, UNSPECIFIED VESSEL OR LESION TYPE: Primary | ICD-10-CM

## 2023-05-17 PROCEDURE — 3078F DIAST BP <80 MM HG: CPT | Performed by: NURSE PRACTITIONER

## 2023-05-17 PROCEDURE — 99214 OFFICE O/P EST MOD 30 MIN: CPT | Performed by: NURSE PRACTITIONER

## 2023-05-17 PROCEDURE — 1160F RVW MEDS BY RX/DR IN RCRD: CPT | Performed by: NURSE PRACTITIONER

## 2023-05-17 PROCEDURE — 1159F MED LIST DOCD IN RCRD: CPT | Performed by: NURSE PRACTITIONER

## 2023-05-17 PROCEDURE — 93000 ELECTROCARDIOGRAM COMPLETE: CPT | Performed by: NURSE PRACTITIONER

## 2023-05-17 PROCEDURE — 3074F SYST BP LT 130 MM HG: CPT | Performed by: NURSE PRACTITIONER

## 2023-05-17 RX ORDER — PIOGLITAZONEHYDROCHLORIDE 30 MG/1
1 TABLET ORAL DAILY
COMMUNITY
Start: 2023-03-09

## 2023-05-17 NOTE — PROGRESS NOTES
Subjective:     Encounter Date: 5/17/2023      Patient ID: Sheeba Saunders is a 73 y.o. male.    Chief Complaint: Follow-up CAD    History of Present Illness     The patient presents to follow-up regarding his history of coronary artery disease status post three-vessel CABG in 2010.  At his visit in February 2017 an attempt was made to get him on a PCSK9 inhibitor for hyperlipidemia since he had previously been intolerant to Crestor and Vytorin.  He did receive 2 injections, but later his insurance coverage was not sufficient and he was left with too much out-of-pocket cost, resulting in discontinuation of the medication.  His PCP later put him on low-dose Crestor and he was doing well with this.      He was doing well at each of his visits in 2018, 2019,2020, 2021-remaining active without any symptoms whatsoever.    I saw him last May 2022 and he continued to do well.  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  He was not having any symptoms including any chest discomfort or shortness of breath.  No changes were made.    Today the patient reports he continues to feel very well.  He continues to mow several yards and continues his walking routine.  He denies any decline in stamina, chest discomfort, shortness of breath, palpitations.  His blood pressure today is normal, but he admits he gets occasional elevated readings at home-systolics 140s to 160s.  He reports his A1c in March was elevated at 8.8, but at that point he was started on 30 mg of Actos and since then he has had a drastic improvement in glucose readings.    The following portions of the patient's history were reviewed and updated as appropriate: allergies, current medications, past family history, past medical history, past social history, past surgical history and problem list.    Review of Systems   Constitutional: Negative for malaise/fatigue.   Cardiovascular:  Negative for chest pain, claudication, dyspnea on exertion, leg  swelling, near-syncope, orthopnea, palpitations, paroxysmal nocturnal dyspnea and syncope.   Respiratory:  Negative for cough and shortness of breath.    Hematologic/Lymphatic: Does not bruise/bleed easily.   Musculoskeletal:  Negative for falls.   Gastrointestinal:  Negative for bloating.   Neurological:  Negative for dizziness, light-headedness and weakness.     Allergies   Allergen Reactions    Dapagliflozin Other (See Comments)    Latex Hives     blisters         Current Outpatient Medications:     aspirin 81 MG EC tablet, Take 1 tablet by mouth Daily., Disp: , Rfl:     Bioflavonoid Products (Bioflex) tablet, Take 1,500 mg by mouth Daily., Disp: , Rfl:     exenatide er (BYDUREON) 2 MG pen-injector injection, Inject 2 mg under the skin into the appropriate area as directed 1 (One) Time Per Week., Disp: , Rfl:     Insulin Glargine (BASAGLAR KWIKPEN) 100 UNIT/ML injection pen, Inject 40 Units under the skin into the appropriate area as directed Daily., Disp: , Rfl:     magnesium oxide (MAG-OX) 400 MG tablet, Take 1 tablet by mouth Daily., Disp: , Rfl:     metoprolol tartrate (LOPRESSOR) 25 MG tablet, Take 1 tablet by mouth 2 (Two) Times a Day., Disp: , Rfl:     Multiple Vitamin (MULTIVITAMINS PO), Take 1 tablet by mouth Daily., Disp: , Rfl:     nitroglycerin (NITROSTAT) 0.4 MG SL tablet, Place 1 tablet under the tongue Every 5 (Five) Minutes As Needed for Chest Pain. Take no more than 3 doses in 15 minutes., Disp: , Rfl:     omeprazole (priLOSEC) 20 MG capsule, Take 1 capsule by mouth Daily., Disp: , Rfl:     pioglitazone (ACTOS) 30 MG tablet, Take 1 tablet by mouth Daily., Disp: , Rfl:     rosuvastatin (CRESTOR) 10 MG tablet, Take 1 tablet by mouth Daily., Disp: , Rfl:     tamsulosin (FLOMAX) 0.4 MG capsule 24 hr capsule, Take 1 capsule by mouth Every Night., Disp: , Rfl:     verapamil (CALAN) 120 MG tablet, Take 1 tablet by mouth Daily., Disp: , Rfl:          Objective:    /78   Pulse 64   Ht 167.6 cm  "(66\")   Wt 87.4 kg (192 lb 9.6 oz)   SpO2 96%   BMI 31.09 kg/m²        Vitals and nursing note reviewed.   Constitutional:       General: Not in acute distress.     Appearance: Well-developed and not in distress. Not diaphoretic.   Neck:      Vascular: No JVD.   Pulmonary:      Effort: Pulmonary effort is normal. No respiratory distress.      Breath sounds: Normal breath sounds.   Cardiovascular:      Normal rate. Regular rhythm.      Murmurs: There is no murmur.   Edema:     Peripheral edema absent.   Abdominal:      Tenderness: There is no abdominal tenderness.   Skin:     General: Skin is warm and dry.   Neurological:      Mental Status: Alert and oriented to person, place, and time.       Lab Review:            ECG 12 Lead    Date/Time: 5/17/2023 2:13 PM  Performed by: Sonia Kramer APRN  Authorized by: Sonia Kramer APRN   Comparison: compared with previous ECG from 5/11/2022  Similar to previous ECG  Rhythm: sinus bradycardia  BPM: 59        Results for orders placed in visit on 07/27/11    SCANNED - ECHOCARDIOGRAM      Assessment:      Problem List Items Addressed This Visit          Cardiac and Vasculature    HLD (hyperlipidemia)    CAD (coronary artery disease) - Primary    Overview     3v CABG '10 (LIMA-LAD, SVG-D1, SVG-PDA) with subsequent low-risk stress echo in '11)           HTN (hypertension)       Endocrine and Metabolic    DM (diabetes mellitus)    Relevant Medications    pioglitazone (ACTOS) 30 MG tablet     Plan:     1.  Coronary artery disease: Established problem, stable.  No angina.    -Continue aspirin 81 mg daily indefinitely  -Continue Crestor 10 mg nightly-previously intolerant to higher doses  -Continue current doses of beta-blocker and calcium channel blocker  -Has not had to use as needed sublingual nitroglycerin    2.  Essential hypertension: Established problem, well-controlled based on today's reading, but admits some elevated readings at home.  Continue current doses of Lopressor " and verapamil.  Given his resting heart rate around 60, I would not increase metoprolol or verapamil further for blood pressure control.  If an additional agent is needed for better blood pressure control, would recommend ACE inhibitor, ARB or thiazide diuretic if renal function and electrolytes are stable.  I have requested most recent labs from PCP.    3.  Mixed hyperlipidemia: Established problem, stable.  LDL was controlled at 51 per May 2020 labs.  We discussed the goal of keeping the LDL less than 70.  More recent lipid panel not currently available to me.  I have requested a copy.  -Continue Crestor 10 mg nightly-intolerant to higher doses; no longer on PCSK9 inhibitor due to high out-of-pocket cost    4.  Diabetes mellitus type 2: Established problem, A1c was uncontrolled at 8.8 per March labs.  However at that point he was started on Actos and reports significant improvement in glucose readings since then.  He is on multiple agents as well as insulin.  Continue to follow closely with PCP for goal A1c of less than 7.  As previously noted, he reported he cannot take SGLT2 inhibitors due to prior genitourinary issues when taking Farxiga in the past.    Follow-up in 1 year with Dr. Bennett, sooner with symptoms or concerns.    I spent 35 minutes caring for Sheeba on this date of service. This time includes time spent by me in the following activities: preparing for the visit, reviewing tests, obtaining and/or reviewing a separately obtained history, performing a medically appropriate examination and/or evaluation, counseling and educating the patient/family/caregiver, and documenting information in the medical record

## 2024-06-20 ENCOUNTER — OFFICE VISIT (OUTPATIENT)
Dept: CARDIOLOGY | Facility: CLINIC | Age: 74
End: 2024-06-20
Payer: MEDICARE

## 2024-06-20 VITALS
OXYGEN SATURATION: 96 % | WEIGHT: 200.6 LBS | HEART RATE: 61 BPM | HEIGHT: 66 IN | SYSTOLIC BLOOD PRESSURE: 114 MMHG | DIASTOLIC BLOOD PRESSURE: 72 MMHG | BODY MASS INDEX: 32.24 KG/M2

## 2024-06-20 DIAGNOSIS — I25.10 CORONARY ARTERY DISEASE INVOLVING NATIVE HEART WITHOUT ANGINA PECTORIS, UNSPECIFIED VESSEL OR LESION TYPE: Primary | ICD-10-CM

## 2024-06-20 DIAGNOSIS — Z79.4 TYPE 2 DIABETES MELLITUS WITH OTHER CIRCULATORY COMPLICATION, WITH LONG-TERM CURRENT USE OF INSULIN: ICD-10-CM

## 2024-06-20 DIAGNOSIS — I10 PRIMARY HYPERTENSION: ICD-10-CM

## 2024-06-20 DIAGNOSIS — E11.59 TYPE 2 DIABETES MELLITUS WITH OTHER CIRCULATORY COMPLICATION, WITH LONG-TERM CURRENT USE OF INSULIN: ICD-10-CM

## 2024-06-20 DIAGNOSIS — E78.2 MIXED HYPERLIPIDEMIA: ICD-10-CM

## 2024-06-20 PROCEDURE — 3078F DIAST BP <80 MM HG: CPT | Performed by: NURSE PRACTITIONER

## 2024-06-20 PROCEDURE — 1160F RVW MEDS BY RX/DR IN RCRD: CPT | Performed by: NURSE PRACTITIONER

## 2024-06-20 PROCEDURE — 99214 OFFICE O/P EST MOD 30 MIN: CPT | Performed by: NURSE PRACTITIONER

## 2024-06-20 PROCEDURE — 93000 ELECTROCARDIOGRAM COMPLETE: CPT | Performed by: NURSE PRACTITIONER

## 2024-06-20 PROCEDURE — 1159F MED LIST DOCD IN RCRD: CPT | Performed by: NURSE PRACTITIONER

## 2024-06-20 PROCEDURE — 3074F SYST BP LT 130 MM HG: CPT | Performed by: NURSE PRACTITIONER

## 2024-06-20 NOTE — PROGRESS NOTES
Subjective:     Encounter Date: 6/20/2024      Patient ID: Sheeba Saunders is a 74 y.o. male.    Chief Complaint: Follow-up CAD    History of Present Illness     The patient presents to follow-up regarding his history of coronary artery disease status post three-vessel CABG in 2010.  At his visit in February 2017 an attempt was made to get him on a PCSK9 inhibitor for hyperlipidemia since he had previously been intolerant to Crestor and Vytorin.  He did receive 2 injections, but later his insurance coverage was not sufficient and he was left with too much out-of-pocket cost, resulting in discontinuation of the medication.  His PCP later put him on low-dose Crestor and he was doing well with this.      Since that time, he has done very well at his annual visits without any chest discomfort or shortness of breath.    Today he states he continues to feel very well.  He continues to mow several lawns.  He denies any chest discomfort, shortness of breath, orthopnea, PND, palpitations, syncope, presyncope or rapid weight gain.  Systolic blood pressures occasionally in the 140s but typically well-controlled.  He has noticed some bilateral lower extremity edema, more so on the left over the past couple of months.    The following portions of the patient's history were reviewed and updated as appropriate: allergies, current medications, past family history, past medical history, past social history, past surgical history and problem list.    Review of Systems   Constitutional: Negative for malaise/fatigue.   Cardiovascular:  Positive for leg swelling. Negative for chest pain, claudication, dyspnea on exertion, near-syncope, orthopnea, palpitations, paroxysmal nocturnal dyspnea and syncope.   Respiratory:  Negative for cough and shortness of breath.    Hematologic/Lymphatic: Does not bruise/bleed easily.   Musculoskeletal:  Negative for falls.   Gastrointestinal:  Negative for bloating.   Neurological:  Negative for  "dizziness, light-headedness and weakness.       Allergies   Allergen Reactions    Dapagliflozin Other (See Comments)    Latex Hives     blisters         Current Outpatient Medications:     aspirin 81 MG EC tablet, Take 1 tablet by mouth Daily., Disp: , Rfl:     Bioflavonoid Products (Bioflex) tablet, Take 1,500 mg by mouth Daily., Disp: , Rfl:     exenatide er (BYDUREON) 2 MG pen-injector injection, Inject 2 mg under the skin into the appropriate area as directed 1 (One) Time Per Week., Disp: , Rfl:     Insulin Glargine (BASAGLAR KWIKPEN) 100 UNIT/ML injection pen, Inject 40 Units under the skin into the appropriate area as directed Daily., Disp: , Rfl:     magnesium oxide (MAG-OX) 400 MG tablet, Take 1 tablet by mouth Daily., Disp: , Rfl:     metoprolol tartrate (LOPRESSOR) 25 MG tablet, Take 1 tablet by mouth 2 (Two) Times a Day., Disp: , Rfl:     Multiple Vitamin (MULTIVITAMINS PO), Take 1 tablet by mouth Daily., Disp: , Rfl:     nitroglycerin (NITROSTAT) 0.4 MG SL tablet, Place 1 tablet under the tongue Every 5 (Five) Minutes As Needed for Chest Pain. Take no more than 3 doses in 15 minutes., Disp: , Rfl:     omeprazole (priLOSEC) 20 MG capsule, Take 1 capsule by mouth Daily., Disp: , Rfl:     pioglitazone (ACTOS) 30 MG tablet, Take 1 tablet by mouth Daily., Disp: , Rfl:     rosuvastatin (CRESTOR) 10 MG tablet, Take 1 tablet by mouth Daily., Disp: , Rfl:     tamsulosin (FLOMAX) 0.4 MG capsule 24 hr capsule, Take 1 capsule by mouth Every Night., Disp: , Rfl:     verapamil (CALAN) 120 MG tablet, Take 1 tablet by mouth Daily., Disp: , Rfl:          Objective:    /72   Pulse 61   Ht 167.6 cm (66\")   Wt 91 kg (200 lb 9.6 oz)   SpO2 96%   BMI 32.38 kg/m²        Vitals and nursing note reviewed.   Constitutional:       General: Not in acute distress.     Appearance: Well-developed and not in distress. Not diaphoretic.   Neck:      Vascular: No JVD.   Pulmonary:      Effort: Pulmonary effort is normal. No " respiratory distress.      Breath sounds: Normal breath sounds.   Cardiovascular:      Normal rate. Regular rhythm.      Murmurs: There is no murmur.   Edema:     Peripheral edema (2+ BLE edema) present.     Pretibial: bilateral 2+ edema of the pretibial area.  Abdominal:      Tenderness: There is no abdominal tenderness.   Skin:     General: Skin is warm and dry.   Neurological:      Mental Status: Alert and oriented to person, place, and time.         Lab Review:            ECG 12 Lead    Date/Time: 6/20/2024 3:18 PM  Performed by: Sonia Pathak APRN    Authorized by: Sonia Pathak APRN  Comparison: compared with previous ECG from 5/17/2023  Similar to previous ECG  Rhythm: sinus rhythm  BPM: 60  Other findings: non-specific ST-T wave changes    Clinical impression: abnormal EKG          Results for orders placed in visit on 07/27/11    SCANNED - ECHOCARDIOGRAM      Assessment:      Problem List Items Addressed This Visit          Cardiac and Vasculature    HLD (hyperlipidemia)    CAD (coronary artery disease) - Primary    Overview     3v CABG '10 (LIMA-LAD, SVG-D1, SVG-PDA) with subsequent low-risk stress echo in '11)         HTN (hypertension)       Endocrine and Metabolic    DM (diabetes mellitus)     Plan:     1.  Coronary artery disease: Established problem, stable.  No angina.    -Continue aspirin 81 mg daily indefinitely  -Continue Crestor 10 mg nightly-previously intolerant to higher doses  -Continue current doses of beta-blocker and calcium channel blocker  -Has not had to use as needed sublingual nitroglycerin    -He does have some lower extremity edema but no evidence of central volume overload, therefore no change in the plan of care at this time but he will call back with development of chest discomfort, shortness of breath, orthopnea, rapid weight gain and we will consider further testing.  He has been counseled on the importance of heart healthy low-sodium diet, elevation of lower extremities and  compression stockings.    2.  Essential hypertension: Established problem, well-controlled.  Continue current medical therapy.    3.  Mixed hyperlipidemia: Established problem, stable.  Continue rosuvastatin 10 mg nightly for goal LDL of less than 70.  He has previously been intolerant to higher doses of rosuvastatin.  He previously had cost/coverage issues with PCSK9 inhibitor.  If unable to get to goal or remain at goal on current regimen consider Zetia or Leqvio depending upon panel results.    4.  Diabetes mellitus type 2: Established problem, he reports his A1c is now well-controlled at 6.2.  As previously noted, he reported he cannot take SGLT2 inhibitors due to prior genitourinary issues when taking Farxiga in the past.    Follow-up in 1 year with Dr. Bennett, sooner with symptoms or concerns.

## 2024-08-08 ENCOUNTER — OFFICE VISIT (OUTPATIENT)
Dept: UROLOGY | Age: 74
End: 2024-08-08

## 2024-08-08 VITALS — BODY MASS INDEX: 30.7 KG/M2 | HEIGHT: 66 IN | WEIGHT: 191 LBS | TEMPERATURE: 97 F

## 2024-08-08 DIAGNOSIS — R97.20 ELEVATED PSA: ICD-10-CM

## 2024-08-08 DIAGNOSIS — N40.1 BENIGN PROSTATIC HYPERPLASIA WITH URINARY RETENTION: Primary | ICD-10-CM

## 2024-08-08 DIAGNOSIS — N13.30 BILATERAL HYDRONEPHROSIS: ICD-10-CM

## 2024-08-08 DIAGNOSIS — R33.8 BENIGN PROSTATIC HYPERPLASIA WITH URINARY RETENTION: Primary | ICD-10-CM

## 2024-08-08 LAB — POST VOID RESIDUAL (PVR): 0 ML

## 2024-08-08 RX ORDER — PIOGLITAZONEHYDROCHLORIDE 30 MG/1
1 TABLET ORAL
COMMUNITY

## 2024-08-08 ASSESSMENT — ENCOUNTER SYMPTOMS
NAUSEA: 0
ABDOMINAL PAIN: 0
VOMITING: 0
BACK PAIN: 0
ABDOMINAL DISTENTION: 0

## 2024-08-08 NOTE — PROGRESS NOTES
Patient of KATI More presents today for voiding trial post episode of urinary retention. The patient denies any fever, chills or  N&V. After patient had given consent, using the catheter in place, 120cc of sterile water was installed into the bladder, patient started having bladder spasms so catheter was removed. PVR was 0ml     KATI More was in office at time of procedure.     Patient was advised to drink clear fluids for the next couple hours and urinate. Patient was advised they may experience some blood in the urine and burning with urination for the next couple days. If the patient is unable to urinate or develops fever, chills, N&V or suprapubic pain, they will call office for an appt at clinic or seek medical treatment at nearest ER, PCP or Urgent Care if after hours. Patient verbalized understanding and all questions were answered.Patient advised to call the office with any questions or concerns.     Patient is to follow up as scheduled.   
COLONOSCOPY N/A 2022    Dr GRETA Fulton-Diverticular disease, tattoo in the rectum, no residual polyp/lesion seen, lipoma in right colon, 5 yr recall    CORONARY ARTERY BYPASS GRAFT         Current Outpatient Medications   Medication Sig Dispense Refill    pioglitazone (ACTOS) 30 MG tablet Take 1 tablet by mouth Every Day      insulin glargine (BASAGLAR KWIKPEN) 100 UNIT/ML injection pen Inject 40 Units into the skin nightly      tamsulosin (FLOMAX) 0.4 MG capsule Take 1 capsule by mouth daily      Rosuvastatin Calcium 10 MG CPSP Take 1 capsule by mouth nightly      nitroGLYCERIN (NITROSTAT) 0.4 MG SL tablet Place 1 tablet under the tongue every 5 minutes as needed for Chest pain up to max of 3 total doses. If no relief after 1 dose, call 911.      Magnesium 400 MG TABS Take 1 tablet by mouth daily      Bioflavonoid Products (BIOFLEX PO) Take 1,500 mg by mouth daily      omeprazole (PRILOSEC) 20 MG capsule Take 1 capsule by mouth daily      verapamil (CALAN) 120 MG tablet Take 1 tablet by mouth daily      exenatide (BYDUREON) 2 MG SUSR injection Inject 1 Syringe into the skin once a week      Metoprolol Tartrate (LOPRESSOR PO) Take 25 mg by mouth daily Indications: half daily       aspirin 81 MG tablet Take 1 tablet by mouth daily      multivitamin (THERAGRAN) per tablet Take 1 tablet by mouth daily       No current facility-administered medications for this visit.       Allergies   Allergen Reactions    Latex Hives     blisters    Dapagliflozin Other (See Comments)     Other Reaction(s): Blood in Urine       Social History     Socioeconomic History    Marital status:      Spouse name: None    Number of children: None    Years of education: None    Highest education level: None   Tobacco Use    Smoking status: Former     Current packs/day: 0.00     Types: Cigarettes     Quit date: 1976     Years since quittin.6    Smokeless tobacco: Never   Vaping Use    Vaping Use: Never used   Substance and

## 2024-08-12 ENCOUNTER — TELEPHONE (OUTPATIENT)
Dept: UROLOGY | Age: 74
End: 2024-08-12

## 2024-08-12 NOTE — TELEPHONE ENCOUNTER
Patient wife, April, contacted office stating that patient was seen last week with APRN. They removed catheter and he had no issues voiding afterward. Yesterday he got up and could not pee. He was taken to [facility] and they placed a catheter. Patient has an appointment on Thursday 8/15/2024.      has requested ER records. Please advise on if appointment for Thursday will need to be change or if he is okay to keep this appointment.

## 2024-08-15 ENCOUNTER — OFFICE VISIT (OUTPATIENT)
Dept: UROLOGY | Age: 74
End: 2024-08-15
Payer: MEDICARE

## 2024-08-15 VITALS — HEIGHT: 66 IN | BODY MASS INDEX: 30.83 KG/M2

## 2024-08-15 DIAGNOSIS — N40.1 BENIGN PROSTATIC HYPERPLASIA WITH URINARY RETENTION: Primary | ICD-10-CM

## 2024-08-15 DIAGNOSIS — R97.20 ELEVATED PSA: ICD-10-CM

## 2024-08-15 DIAGNOSIS — N13.30 BILATERAL HYDRONEPHROSIS: ICD-10-CM

## 2024-08-15 DIAGNOSIS — R33.8 BENIGN PROSTATIC HYPERPLASIA WITH URINARY RETENTION: Primary | ICD-10-CM

## 2024-08-15 PROCEDURE — 99214 OFFICE O/P EST MOD 30 MIN: CPT | Performed by: NURSE PRACTITIONER

## 2024-08-15 PROCEDURE — G8427 DOCREV CUR MEDS BY ELIG CLIN: HCPCS | Performed by: NURSE PRACTITIONER

## 2024-08-15 PROCEDURE — G8417 CALC BMI ABV UP PARAM F/U: HCPCS | Performed by: NURSE PRACTITIONER

## 2024-08-15 PROCEDURE — 3017F COLORECTAL CA SCREEN DOC REV: CPT | Performed by: NURSE PRACTITIONER

## 2024-08-15 PROCEDURE — 1036F TOBACCO NON-USER: CPT | Performed by: NURSE PRACTITIONER

## 2024-08-15 PROCEDURE — 1123F ACP DISCUSS/DSCN MKR DOCD: CPT | Performed by: NURSE PRACTITIONER

## 2024-08-15 NOTE — PROGRESS NOTES
Smoking status: Former     Current packs/day: 0.00     Types: Cigarettes     Quit date: 1976     Years since quittin.7    Smokeless tobacco: Never   Vaping Use    Vaping status: Never Used   Substance and Sexual Activity    Alcohol use: No    Drug use: No     Social Determinants of Health      Received from [facility]    Family and Community Support    Received from [facility]    Abuse Screen    Received from [facility]    Housing Stability       Family History   Problem Relation Age of Onset    Cancer Mother         stomach cancer    Stomach Cancer Mother     Cancer Father         pancreatic cancer    Colon Cancer Neg Hx     Colon Polyps Neg Hx     Esophageal Cancer Neg Hx     Liver Cancer Neg Hx     Liver Disease Neg Hx     Rectal Cancer Neg Hx        REVIEW OF SYSTEMS:  Review of Systems   Constitutional:  Negative for chills and fever.   Gastrointestinal:  Negative for abdominal distention, abdominal pain, nausea and vomiting.   Genitourinary:  Positive for difficulty urinating. Negative for dysuria, flank pain, frequency, hematuria and urgency.   Musculoskeletal:  Negative for back pain and gait problem.   Psychiatric/Behavioral:  Negative for agitation and confusion.        PHYSICAL EXAM:  Ht 1.676 m (5' 6\")   BMI 30.83 kg/m²   Physical Exam  Vitals and nursing note reviewed.   Constitutional:       General: He is not in acute distress.     Appearance: Normal appearance. He is not ill-appearing.   Pulmonary:      Effort: Pulmonary effort is normal. No respiratory distress.   Abdominal:      General: There is no distension.      Tenderness: There is no abdominal tenderness. There is no right CVA tenderness or left CVA tenderness.   Neurological:      Mental Status: He is alert and oriented to person, place, and time. Mental status is at baseline.   Psychiatric:         Mood and Affect: Mood normal.         Behavior: Behavior normal.       DATA:  Previous

## 2024-08-16 ASSESSMENT — ENCOUNTER SYMPTOMS
ABDOMINAL DISTENTION: 0
ABDOMINAL PAIN: 0
NAUSEA: 0
VOMITING: 0
BACK PAIN: 0

## 2024-08-20 ENCOUNTER — NURSE ONLY (OUTPATIENT)
Dept: UROLOGY | Age: 74
End: 2024-08-20
Payer: MEDICARE

## 2024-08-20 DIAGNOSIS — R33.8 BENIGN PROSTATIC HYPERPLASIA WITH URINARY RETENTION: Primary | ICD-10-CM

## 2024-08-20 DIAGNOSIS — N40.1 BENIGN PROSTATIC HYPERPLASIA WITH URINARY RETENTION: Primary | ICD-10-CM

## 2024-08-20 PROCEDURE — 51700 IRRIGATION OF BLADDER: CPT | Performed by: NURSE PRACTITIONER

## 2024-08-20 NOTE — PROGRESS NOTES
Chronic Indwelling Benjamin Catheter  Patient presents today with a history of a chronic indwelling urethral benjamin catheter for several weeks.  The reason for the indwelling benjamin is due to: Incomplete bladder emptying.  Overall, the problem(s) are  hematuria and spasms .  Any problems since last benjamin catheter change: yes - bladder spasms    Procedure Note (8/20/24):  Patient came in today for bladder spasms and blood. He had a benjamin placed at a [facility]. I removed a 14f coude catheter. As soon as cathter balloon was drained blood came out of the catheter and the tip of penis. The patient's benjamin catheter was removed. Using sterile technique, patient was cleaned with Hibiclens and sterile water solution. A 16f coude catheter was inserted into the bladder, bladder was drained of 50cc of bloody urine, 10 mL of sterile water was used to fill up the balloon.  Medical Assistant handed irrigated 500cc to clear the urine. Patients urine was very light pink after irrigation. The catheter was then hooked up to a drainage bag.   The patient tolerated the procedure well.    Patient should follow up on Monday Aug 26th for cysto/ TRUS. .

## 2024-08-26 ENCOUNTER — PROCEDURE VISIT (OUTPATIENT)
Dept: UROLOGY | Age: 74
End: 2024-08-26
Payer: MEDICARE

## 2024-08-26 VITALS — WEIGHT: 191 LBS | TEMPERATURE: 98.6 F | HEIGHT: 66 IN | BODY MASS INDEX: 30.7 KG/M2

## 2024-08-26 DIAGNOSIS — N40.1 BENIGN PROSTATIC HYPERPLASIA WITH URINARY RETENTION: Primary | ICD-10-CM

## 2024-08-26 DIAGNOSIS — R33.8 BENIGN PROSTATIC HYPERPLASIA WITH URINARY RETENTION: Primary | ICD-10-CM

## 2024-08-26 PROCEDURE — 52000 CYSTOURETHROSCOPY: CPT | Performed by: UROLOGY

## 2024-08-26 PROCEDURE — 76872 US TRANSRECTAL: CPT | Performed by: UROLOGY

## 2024-08-26 RX ORDER — METOPROLOL TARTRATE 25 MG/1
TABLET, FILM COATED ORAL
COMMUNITY
Start: 2024-08-22

## 2024-08-26 NOTE — PROGRESS NOTES
BPH  Patient is a 74-year-old male who presents to clinic today for follow-up.  I initially saw him on 8/8/2024 for a voiding trial.  Prior to this he had been seen at [facility] ER on 7/30/2024 with difficulty urinating.  At that time CT revealing a mildly distended, mild bladder bilateral hydroureteronephrosis, renal cyst, prostate measuring 6.7 cm.  Suero catheter was placed with 800 mL urine output.  He presented to the office on 8/8/2024 for voiding trial and he was able to empty his bladder completely.  He denies any issues urinating for about 3 days however on the fourth day ended up going back to the ER where Suero catheter was placed.  I do not have documents from the ER.  He comes in today now with Suero catheter again.  Denies any constipation or change in medications.  Currently maintained on Flomax 0.8 mg daily and has been for several years.  Lower urinary tract symptoms prior to catheter placement include straining, frequency, urgency intermittency weak stream and nocturia.     Elevated PSA  Patient with history of elevated PSA.  Denies any personal or family history of prostate cancer.  No history of nephrolithiasis, hematuria, prostatitis or UTI.    Previous PSAs as below     11/8/23            PSA                 4.4        He has not had a biopsy this is below his age-adjusted cut of 6.5    Patient with above history comes in today for further valuation for cystoscopy, TRUS for volume determination, and uroflow.  He is longstanding diabetic but denies symptoms of neuropathy.    Cystoscopy Procedure Note    Indications: Diagnosis    Pre-operative Diagnosis: BPH with urinary retention    Post-operative Diagnosis: Same    Surgeon: Robert Keita MD     Assistants: staff    Anesthesia: Local anesthesia topical 2% lidocaine gel    Procedure Details   The risks, benefits, complications, treatment options, and expected outcomes were discussed with the patient. The patient concurred with the

## 2024-09-03 ENCOUNTER — TELEPHONE (OUTPATIENT)
Dept: UROLOGY | Age: 74
End: 2024-09-03

## 2024-09-03 NOTE — TELEPHONE ENCOUNTER
Patient called stating he was having swelling in testicle and went to [place] ED on Sunday. Patient was scheduled for US and received antibiotics. Patient was advised to get US on disc for appt Monday 9/9/24 and to continue prescribed medications. Records have been requested on patient from ED. Patient was advised to contact office if swelling got worse. Patient wasn't sure if there was anything else that he needed to do or be seen sooner. Please advise or contact patient.

## 2024-09-09 ENCOUNTER — OFFICE VISIT (OUTPATIENT)
Dept: UROLOGY | Age: 74
End: 2024-09-09
Payer: MEDICARE

## 2024-09-09 ENCOUNTER — PREP FOR PROCEDURE (OUTPATIENT)
Dept: UROLOGY | Age: 74
End: 2024-09-09

## 2024-09-09 DIAGNOSIS — N40.1 BENIGN PROSTATIC HYPERPLASIA WITH URINARY RETENTION: Primary | ICD-10-CM

## 2024-09-09 DIAGNOSIS — R33.8 BENIGN PROSTATIC HYPERPLASIA WITH URINARY RETENTION: Primary | ICD-10-CM

## 2024-09-09 DIAGNOSIS — N13.8 ENLARGED PROSTATE WITH URINARY OBSTRUCTION: ICD-10-CM

## 2024-09-09 DIAGNOSIS — N40.1 ENLARGED PROSTATE WITH URINARY OBSTRUCTION: ICD-10-CM

## 2024-09-09 DIAGNOSIS — N45.1 EPIDIDYMITIS, RIGHT: ICD-10-CM

## 2024-09-09 PROCEDURE — G8427 DOCREV CUR MEDS BY ELIG CLIN: HCPCS | Performed by: UROLOGY

## 2024-09-09 PROCEDURE — 99214 OFFICE O/P EST MOD 30 MIN: CPT | Performed by: UROLOGY

## 2024-09-09 PROCEDURE — 3017F COLORECTAL CA SCREEN DOC REV: CPT | Performed by: UROLOGY

## 2024-09-09 PROCEDURE — 1036F TOBACCO NON-USER: CPT | Performed by: UROLOGY

## 2024-09-09 PROCEDURE — 1123F ACP DISCUSS/DSCN MKR DOCD: CPT | Performed by: UROLOGY

## 2024-09-09 PROCEDURE — G8417 CALC BMI ABV UP PARAM F/U: HCPCS | Performed by: UROLOGY

## 2024-09-09 RX ORDER — LEVOFLOXACIN 500 MG/1
500 TABLET, FILM COATED ORAL DAILY
Qty: 7 TABLET | Refills: 1 | Status: SHIPPED | OUTPATIENT
Start: 2024-09-09 | End: 2024-09-16

## 2024-09-09 RX ORDER — LEVOFLOXACIN 500 MG/1
500 TABLET, FILM COATED ORAL DAILY
COMMUNITY
Start: 2024-09-01 | End: 2024-09-09 | Stop reason: SDUPTHER

## 2024-09-09 ASSESSMENT — ENCOUNTER SYMPTOMS
NAUSEA: 0
COUGH: 0
SHORTNESS OF BREATH: 0
EYE DISCHARGE: 0
BACK PAIN: 0
EYE REDNESS: 0
ABDOMINAL DISTENTION: 0
DIARRHEA: 0
TROUBLE SWALLOWING: 0
VOMITING: 0
ABDOMINAL PAIN: 0
CONSTIPATION: 0

## 2024-09-11 ENCOUNTER — TELEPHONE (OUTPATIENT)
Dept: CARDIOLOGY | Facility: CLINIC | Age: 74
End: 2024-09-11
Payer: MEDICARE

## 2024-09-11 NOTE — TELEPHONE ENCOUNTER
The patient is being scheduled for an aquablation of the prostate on 9/24/24 with Dr. Bosch.  They are requesting instructions for holding ASA 81 mg.  Thank you.

## 2024-09-13 ENCOUNTER — HOSPITAL ENCOUNTER (OUTPATIENT)
Dept: PREADMISSION TESTING | Age: 74
Discharge: HOME | End: 2024-09-17
Payer: MEDICARE

## 2024-09-13 VITALS — WEIGHT: 185 LBS | BODY MASS INDEX: 29.86 KG/M2

## 2024-09-13 DIAGNOSIS — R33.8 BENIGN PROSTATIC HYPERPLASIA WITH URINARY RETENTION: ICD-10-CM

## 2024-09-13 DIAGNOSIS — N40.1 BENIGN PROSTATIC HYPERPLASIA WITH URINARY RETENTION: ICD-10-CM

## 2024-09-13 LAB
ANION GAP SERPL CALCULATED.3IONS-SCNC: 9 MMOL/L (ref 7–19)
BASOPHILS # BLD: 0 K/UL (ref 0–0.2)
BASOPHILS NFR BLD: 0.4 % (ref 0–1)
BUN SERPL-MCNC: 21 MG/DL (ref 8–23)
CALCIUM SERPL-MCNC: 9.1 MG/DL (ref 8.8–10.2)
CHLORIDE SERPL-SCNC: 105 MMOL/L (ref 98–111)
CO2 SERPL-SCNC: 26 MMOL/L (ref 22–29)
CREAT SERPL-MCNC: 0.9 MG/DL (ref 0.7–1.2)
EOSINOPHIL # BLD: 0.2 K/UL (ref 0–0.6)
EOSINOPHIL NFR BLD: 2.5 % (ref 0–5)
ERYTHROCYTE [DISTWIDTH] IN BLOOD BY AUTOMATED COUNT: 13 % (ref 11.5–14.5)
GLUCOSE SERPL-MCNC: 141 MG/DL (ref 70–99)
HCT VFR BLD AUTO: 37.8 % (ref 42–52)
HGB BLD-MCNC: 11.9 G/DL (ref 14–18)
IMM GRANULOCYTES # BLD: 0 K/UL
LYMPHOCYTES # BLD: 1.1 K/UL (ref 1.1–4.5)
LYMPHOCYTES NFR BLD: 15.5 % (ref 20–40)
MCH RBC QN AUTO: 29.5 PG (ref 27–31)
MCHC RBC AUTO-ENTMCNC: 31.5 G/DL (ref 33–37)
MCV RBC AUTO: 93.6 FL (ref 80–94)
MONOCYTES # BLD: 0.5 K/UL (ref 0–0.9)
MONOCYTES NFR BLD: 6.4 % (ref 0–10)
NEUTROPHILS # BLD: 5.4 K/UL (ref 1.5–7.5)
NEUTS SEG NFR BLD: 74.9 % (ref 50–65)
PLATELET # BLD AUTO: 273 K/UL (ref 130–400)
PMV BLD AUTO: 9.1 FL (ref 9.4–12.4)
POTASSIUM SERPL-SCNC: 4.9 MMOL/L (ref 3.5–5)
RBC # BLD AUTO: 4.04 M/UL (ref 4.7–6.1)
SODIUM SERPL-SCNC: 140 MMOL/L (ref 136–145)
WBC # BLD AUTO: 7.2 K/UL (ref 4.8–10.8)

## 2024-09-13 PROCEDURE — 85025 COMPLETE CBC W/AUTO DIFF WBC: CPT

## 2024-09-13 PROCEDURE — 80048 BASIC METABOLIC PNL TOTAL CA: CPT

## 2024-09-19 ENCOUNTER — TELEPHONE (OUTPATIENT)
Dept: UROLOGY | Age: 74
End: 2024-09-19

## 2024-09-24 ENCOUNTER — ANESTHESIA EVENT (OUTPATIENT)
Dept: OPERATING ROOM | Age: 74
End: 2024-09-24
Payer: MEDICARE

## 2024-09-24 ENCOUNTER — HOSPITAL ENCOUNTER (OUTPATIENT)
Age: 74
Setting detail: OBSERVATION
Discharge: HOME | End: 2024-09-25
Attending: UROLOGY | Admitting: UROLOGY
Payer: MEDICARE

## 2024-09-24 ENCOUNTER — ANESTHESIA (OUTPATIENT)
Dept: OPERATING ROOM | Age: 74
End: 2024-09-24
Payer: MEDICARE

## 2024-09-24 DIAGNOSIS — N40.1 BENIGN PROSTATIC HYPERPLASIA WITH URINARY RETENTION: ICD-10-CM

## 2024-09-24 DIAGNOSIS — N40.1 ENLARGED PROSTATE WITH URINARY OBSTRUCTION: ICD-10-CM

## 2024-09-24 DIAGNOSIS — R33.8 BENIGN PROSTATIC HYPERPLASIA WITH URINARY RETENTION: ICD-10-CM

## 2024-09-24 DIAGNOSIS — N13.8 ENLARGED PROSTATE WITH URINARY OBSTRUCTION: ICD-10-CM

## 2024-09-24 DIAGNOSIS — R33.9 URINARY RETENTION: Primary | ICD-10-CM

## 2024-09-24 LAB
ABO/RH: NORMAL
ANTIBODY SCREEN: NORMAL
APTT PPP: 26.1 SEC (ref 26–36.2)
GLUCOSE BLD-MCNC: 119 MG/DL (ref 70–99)
GLUCOSE BLD-MCNC: 132 MG/DL (ref 70–99)
GLUCOSE BLD-MCNC: 167 MG/DL (ref 70–99)
GLUCOSE BLD-MCNC: 263 MG/DL (ref 70–99)
INR PPP: 1.08 (ref 0.88–1.18)
PERFORMED ON: ABNORMAL
PROTHROMBIN TIME: 13.7 SEC (ref 12–14.6)

## 2024-09-24 PROCEDURE — 2580000003 HC RX 258: Performed by: UROLOGY

## 2024-09-24 PROCEDURE — G0378 HOSPITAL OBSERVATION PER HR: HCPCS

## 2024-09-24 PROCEDURE — 2580000003 HC RX 258: Performed by: ANESTHESIOLOGY

## 2024-09-24 PROCEDURE — 86900 BLOOD TYPING SEROLOGIC ABO: CPT

## 2024-09-24 PROCEDURE — 2500000003 HC RX 250 WO HCPCS: Performed by: NURSE ANESTHETIST, CERTIFIED REGISTERED

## 2024-09-24 PROCEDURE — 2720000010 HC SURG SUPPLY STERILE: Performed by: UROLOGY

## 2024-09-24 PROCEDURE — 82962 GLUCOSE BLOOD TEST: CPT

## 2024-09-24 PROCEDURE — 88305 TISSUE EXAM BY PATHOLOGIST: CPT

## 2024-09-24 PROCEDURE — 3700000001 HC ADD 15 MINUTES (ANESTHESIA): Performed by: UROLOGY

## 2024-09-24 PROCEDURE — 85610 PROTHROMBIN TIME: CPT

## 2024-09-24 PROCEDURE — 6360000002 HC RX W HCPCS: Performed by: UROLOGY

## 2024-09-24 PROCEDURE — 3600000006 HC SURGERY LEVEL 6 BASE: Performed by: UROLOGY

## 2024-09-24 PROCEDURE — 6370000000 HC RX 637 (ALT 250 FOR IP): Performed by: UROLOGY

## 2024-09-24 PROCEDURE — 2709999900 HC NON-CHARGEABLE SUPPLY: Performed by: UROLOGY

## 2024-09-24 PROCEDURE — 3700000000 HC ANESTHESIA ATTENDED CARE: Performed by: UROLOGY

## 2024-09-24 PROCEDURE — 7100000001 HC PACU RECOVERY - ADDTL 15 MIN: Performed by: UROLOGY

## 2024-09-24 PROCEDURE — 94760 N-INVAS EAR/PLS OXIMETRY 1: CPT

## 2024-09-24 PROCEDURE — C2596 PROBE, ROBOTIC, WATER-JET: HCPCS | Performed by: UROLOGY

## 2024-09-24 PROCEDURE — 94150 VITAL CAPACITY TEST: CPT

## 2024-09-24 PROCEDURE — 86850 RBC ANTIBODY SCREEN: CPT

## 2024-09-24 PROCEDURE — 86901 BLOOD TYPING SEROLOGIC RH(D): CPT

## 2024-09-24 PROCEDURE — 3600000016 HC SURGERY LEVEL 6 ADDTL 15MIN: Performed by: UROLOGY

## 2024-09-24 PROCEDURE — 7100000000 HC PACU RECOVERY - FIRST 15 MIN: Performed by: UROLOGY

## 2024-09-24 PROCEDURE — 6360000002 HC RX W HCPCS: Performed by: NURSE ANESTHETIST, CERTIFIED REGISTERED

## 2024-09-24 PROCEDURE — 2580000003 HC RX 258: Performed by: NURSE ANESTHETIST, CERTIFIED REGISTERED

## 2024-09-24 PROCEDURE — 36415 COLL VENOUS BLD VENIPUNCTURE: CPT

## 2024-09-24 PROCEDURE — 85730 THROMBOPLASTIN TIME PARTIAL: CPT

## 2024-09-24 RX ORDER — TRANEXAMIC ACID 100 MG/ML
INJECTION, SOLUTION INTRAVENOUS
Status: DISCONTINUED | OUTPATIENT
Start: 2024-09-24 | End: 2024-09-24 | Stop reason: SDUPTHER

## 2024-09-24 RX ORDER — HYDROMORPHONE HYDROCHLORIDE 1 MG/ML
0.5 INJECTION, SOLUTION INTRAMUSCULAR; INTRAVENOUS; SUBCUTANEOUS EVERY 5 MIN PRN
Status: DISCONTINUED | OUTPATIENT
Start: 2024-09-24 | End: 2024-09-24 | Stop reason: HOSPADM

## 2024-09-24 RX ORDER — MULTIVITAMIN WITH IRON
1 TABLET ORAL DAILY
Status: DISCONTINUED | OUTPATIENT
Start: 2024-09-24 | End: 2024-09-25 | Stop reason: HOSPADM

## 2024-09-24 RX ORDER — MAGNESIUM SULFATE IN WATER 40 MG/ML
2000 INJECTION, SOLUTION INTRAVENOUS PRN
Status: DISCONTINUED | OUTPATIENT
Start: 2024-09-24 | End: 2024-09-25 | Stop reason: HOSPADM

## 2024-09-24 RX ORDER — TROSPIUM CHLORIDE 20 MG/1
20 TABLET, FILM COATED ORAL
Status: DISCONTINUED | OUTPATIENT
Start: 2024-09-24 | End: 2024-09-25 | Stop reason: HOSPADM

## 2024-09-24 RX ORDER — SODIUM CHLORIDE 0.9 % (FLUSH) 0.9 %
5-40 SYRINGE (ML) INJECTION PRN
Status: DISCONTINUED | OUTPATIENT
Start: 2024-09-24 | End: 2024-09-25 | Stop reason: HOSPADM

## 2024-09-24 RX ORDER — SODIUM CHLORIDE, SODIUM LACTATE, POTASSIUM CHLORIDE, CALCIUM CHLORIDE 600; 310; 30; 20 MG/100ML; MG/100ML; MG/100ML; MG/100ML
INJECTION, SOLUTION INTRAVENOUS
Status: DISCONTINUED | OUTPATIENT
Start: 2024-09-24 | End: 2024-09-24 | Stop reason: SDUPTHER

## 2024-09-24 RX ORDER — METOPROLOL TARTRATE 25 MG/1
25 TABLET, FILM COATED ORAL 2 TIMES DAILY
Status: DISCONTINUED | OUTPATIENT
Start: 2024-09-24 | End: 2024-09-25 | Stop reason: HOSPADM

## 2024-09-24 RX ORDER — LIDOCAINE HYDROCHLORIDE 10 MG/ML
INJECTION, SOLUTION INFILTRATION; PERINEURAL
Status: DISCONTINUED | OUTPATIENT
Start: 2024-09-24 | End: 2024-09-24 | Stop reason: SDUPTHER

## 2024-09-24 RX ORDER — POLYETHYLENE GLYCOL 3350 17 G/17G
17 POWDER, FOR SOLUTION ORAL DAILY PRN
Status: DISCONTINUED | OUTPATIENT
Start: 2024-09-24 | End: 2024-09-25 | Stop reason: HOSPADM

## 2024-09-24 RX ORDER — NALOXONE HYDROCHLORIDE 0.4 MG/ML
INJECTION, SOLUTION INTRAMUSCULAR; INTRAVENOUS; SUBCUTANEOUS PRN
Status: DISCONTINUED | OUTPATIENT
Start: 2024-09-24 | End: 2024-09-24 | Stop reason: HOSPADM

## 2024-09-24 RX ORDER — POTASSIUM CHLORIDE 1500 MG/1
40 TABLET, EXTENDED RELEASE ORAL PRN
Status: DISCONTINUED | OUTPATIENT
Start: 2024-09-24 | End: 2024-09-25 | Stop reason: HOSPADM

## 2024-09-24 RX ORDER — HYDROMORPHONE HYDROCHLORIDE 1 MG/ML
0.25 INJECTION, SOLUTION INTRAMUSCULAR; INTRAVENOUS; SUBCUTANEOUS
Status: DISCONTINUED | OUTPATIENT
Start: 2024-09-24 | End: 2024-09-25 | Stop reason: HOSPADM

## 2024-09-24 RX ORDER — ONDANSETRON 2 MG/ML
4 INJECTION INTRAMUSCULAR; INTRAVENOUS
Status: DISCONTINUED | OUTPATIENT
Start: 2024-09-24 | End: 2024-09-24 | Stop reason: HOSPADM

## 2024-09-24 RX ORDER — DEXTROSE MONOHYDRATE 100 MG/ML
INJECTION, SOLUTION INTRAVENOUS CONTINUOUS PRN
Status: DISCONTINUED | OUTPATIENT
Start: 2024-09-24 | End: 2024-09-25 | Stop reason: HOSPADM

## 2024-09-24 RX ORDER — NITROGLYCERIN 0.4 MG/1
0.4 TABLET SUBLINGUAL EVERY 5 MIN PRN
Status: DISCONTINUED | OUTPATIENT
Start: 2024-09-24 | End: 2024-09-25 | Stop reason: HOSPADM

## 2024-09-24 RX ORDER — OXYCODONE HYDROCHLORIDE 10 MG/1
10 TABLET ORAL EVERY 4 HOURS PRN
Status: DISCONTINUED | OUTPATIENT
Start: 2024-09-24 | End: 2024-09-25 | Stop reason: HOSPADM

## 2024-09-24 RX ORDER — ACETAMINOPHEN 325 MG/1
650 TABLET ORAL EVERY 6 HOURS
Status: DISCONTINUED | OUTPATIENT
Start: 2024-09-24 | End: 2024-09-25 | Stop reason: HOSPADM

## 2024-09-24 RX ORDER — SODIUM CHLORIDE 0.9 % (FLUSH) 0.9 %
5-40 SYRINGE (ML) INJECTION EVERY 12 HOURS SCHEDULED
Status: DISCONTINUED | OUTPATIENT
Start: 2024-09-24 | End: 2024-09-24 | Stop reason: HOSPADM

## 2024-09-24 RX ORDER — SODIUM CHLORIDE, SODIUM LACTATE, POTASSIUM CHLORIDE, CALCIUM CHLORIDE 600; 310; 30; 20 MG/100ML; MG/100ML; MG/100ML; MG/100ML
INJECTION, SOLUTION INTRAVENOUS CONTINUOUS
Status: DISCONTINUED | OUTPATIENT
Start: 2024-09-24 | End: 2024-09-24 | Stop reason: HOSPADM

## 2024-09-24 RX ORDER — POTASSIUM CHLORIDE 7.45 MG/ML
10 INJECTION INTRAVENOUS PRN
Status: DISCONTINUED | OUTPATIENT
Start: 2024-09-24 | End: 2024-09-25 | Stop reason: HOSPADM

## 2024-09-24 RX ORDER — GLYCOPYRROLATE 0.2 MG/ML
INJECTION INTRAMUSCULAR; INTRAVENOUS
Status: DISCONTINUED | OUTPATIENT
Start: 2024-09-24 | End: 2024-09-24 | Stop reason: SDUPTHER

## 2024-09-24 RX ORDER — SODIUM CHLORIDE 0.9 % (FLUSH) 0.9 %
5-40 SYRINGE (ML) INJECTION EVERY 12 HOURS SCHEDULED
Status: DISCONTINUED | OUTPATIENT
Start: 2024-09-24 | End: 2024-09-25 | Stop reason: HOSPADM

## 2024-09-24 RX ORDER — HYDROMORPHONE HYDROCHLORIDE 1 MG/ML
0.25 INJECTION, SOLUTION INTRAMUSCULAR; INTRAVENOUS; SUBCUTANEOUS EVERY 5 MIN PRN
Status: DISCONTINUED | OUTPATIENT
Start: 2024-09-24 | End: 2024-09-24 | Stop reason: HOSPADM

## 2024-09-24 RX ORDER — ONDANSETRON 4 MG/1
4 TABLET, ORALLY DISINTEGRATING ORAL EVERY 8 HOURS PRN
Status: DISCONTINUED | OUTPATIENT
Start: 2024-09-24 | End: 2024-09-25 | Stop reason: HOSPADM

## 2024-09-24 RX ORDER — PANTOPRAZOLE SODIUM 40 MG/1
40 TABLET, DELAYED RELEASE ORAL
Status: DISCONTINUED | OUTPATIENT
Start: 2024-09-25 | End: 2024-09-25 | Stop reason: HOSPADM

## 2024-09-24 RX ORDER — DOCUSATE SODIUM 100 MG/1
100 CAPSULE, LIQUID FILLED ORAL DAILY
Status: DISCONTINUED | OUTPATIENT
Start: 2024-09-24 | End: 2024-09-25 | Stop reason: HOSPADM

## 2024-09-24 RX ORDER — OXYCODONE HYDROCHLORIDE 5 MG/1
5 TABLET ORAL EVERY 4 HOURS PRN
Status: DISCONTINUED | OUTPATIENT
Start: 2024-09-24 | End: 2024-09-25 | Stop reason: HOSPADM

## 2024-09-24 RX ORDER — SODIUM CHLORIDE 9 MG/ML
INJECTION, SOLUTION INTRAVENOUS PRN
Status: DISCONTINUED | OUTPATIENT
Start: 2024-09-24 | End: 2024-09-24 | Stop reason: HOSPADM

## 2024-09-24 RX ORDER — SODIUM CHLORIDE 0.9 % (FLUSH) 0.9 %
5-40 SYRINGE (ML) INJECTION PRN
Status: DISCONTINUED | OUTPATIENT
Start: 2024-09-24 | End: 2024-09-24 | Stop reason: HOSPADM

## 2024-09-24 RX ORDER — ROSUVASTATIN CALCIUM 10 MG/1
10 TABLET, COATED ORAL DAILY
Status: DISCONTINUED | OUTPATIENT
Start: 2024-09-24 | End: 2024-09-25 | Stop reason: HOSPADM

## 2024-09-24 RX ORDER — VERAPAMIL HYDROCHLORIDE 80 MG/1
120 TABLET ORAL DAILY
Status: DISCONTINUED | OUTPATIENT
Start: 2024-09-24 | End: 2024-09-25 | Stop reason: HOSPADM

## 2024-09-24 RX ORDER — HYDROMORPHONE HYDROCHLORIDE 1 MG/ML
0.5 INJECTION, SOLUTION INTRAMUSCULAR; INTRAVENOUS; SUBCUTANEOUS
Status: DISCONTINUED | OUTPATIENT
Start: 2024-09-24 | End: 2024-09-25 | Stop reason: HOSPADM

## 2024-09-24 RX ORDER — SODIUM CHLORIDE 9 MG/ML
INJECTION, SOLUTION INTRAVENOUS CONTINUOUS
Status: DISCONTINUED | OUTPATIENT
Start: 2024-09-24 | End: 2024-09-25 | Stop reason: HOSPADM

## 2024-09-24 RX ORDER — LANOLIN ALCOHOL/MO/W.PET/CERES
400 CREAM (GRAM) TOPICAL DAILY
Status: DISCONTINUED | OUTPATIENT
Start: 2024-09-24 | End: 2024-09-25 | Stop reason: HOSPADM

## 2024-09-24 RX ORDER — BISACODYL 10 MG
10 SUPPOSITORY, RECTAL RECTAL DAILY PRN
Status: DISCONTINUED | OUTPATIENT
Start: 2024-09-24 | End: 2024-09-25 | Stop reason: HOSPADM

## 2024-09-24 RX ORDER — BUPIVACAINE HYDROCHLORIDE 7.5 MG/ML
INJECTION, SOLUTION INTRASPINAL
Status: COMPLETED | OUTPATIENT
Start: 2024-09-24 | End: 2024-09-24

## 2024-09-24 RX ORDER — INSULIN GLARGINE 100 [IU]/ML
40 INJECTION, SOLUTION SUBCUTANEOUS NIGHTLY
Status: DISCONTINUED | OUTPATIENT
Start: 2024-09-24 | End: 2024-09-24

## 2024-09-24 RX ORDER — INSULIN GLARGINE 100 [IU]/ML
23 INJECTION, SOLUTION SUBCUTANEOUS NIGHTLY
Status: DISCONTINUED | OUTPATIENT
Start: 2024-09-24 | End: 2024-09-25 | Stop reason: HOSPADM

## 2024-09-24 RX ORDER — PROPOFOL 10 MG/ML
INJECTION, EMULSION INTRAVENOUS
Status: DISCONTINUED | OUTPATIENT
Start: 2024-09-24 | End: 2024-09-24 | Stop reason: SDUPTHER

## 2024-09-24 RX ORDER — SODIUM CHLORIDE 9 MG/ML
INJECTION, SOLUTION INTRAVENOUS PRN
Status: DISCONTINUED | OUTPATIENT
Start: 2024-09-24 | End: 2024-09-25 | Stop reason: HOSPADM

## 2024-09-24 RX ORDER — PIOGLITAZONEHYDROCHLORIDE 30 MG/1
30 TABLET ORAL DAILY
Status: DISCONTINUED | OUTPATIENT
Start: 2024-09-24 | End: 2024-09-25 | Stop reason: HOSPADM

## 2024-09-24 RX ORDER — ONDANSETRON 2 MG/ML
4 INJECTION INTRAMUSCULAR; INTRAVENOUS EVERY 6 HOURS PRN
Status: DISCONTINUED | OUTPATIENT
Start: 2024-09-24 | End: 2024-09-25 | Stop reason: HOSPADM

## 2024-09-24 RX ADMIN — DOCUSATE SODIUM 100 MG: 100 CAPSULE, LIQUID FILLED ORAL at 17:01

## 2024-09-24 RX ADMIN — SODIUM CHLORIDE: 9 INJECTION, SOLUTION INTRAVENOUS at 12:29

## 2024-09-24 RX ADMIN — ACETAMINOPHEN 650 MG: 325 TABLET ORAL at 22:57

## 2024-09-24 RX ADMIN — TROSPIUM CHLORIDE 20 MG: 20 TABLET, FILM COATED ORAL at 17:01

## 2024-09-24 RX ADMIN — BUPIVACAINE HYDROCHLORIDE IN DEXTROSE 15 MG: 7.5 INJECTION, SOLUTION SUBARACHNOID at 08:23

## 2024-09-24 RX ADMIN — INSULIN GLARGINE 23 UNITS: 100 INJECTION, SOLUTION SUBCUTANEOUS at 20:58

## 2024-09-24 RX ADMIN — PROPOFOL 30 MG: 10 INJECTION, EMULSION INTRAVENOUS at 08:32

## 2024-09-24 RX ADMIN — METOPROLOL TARTRATE 25 MG: 25 TABLET, FILM COATED ORAL at 20:58

## 2024-09-24 RX ADMIN — HYOSCYAMINE SULFATE 0.12 MG: 0.12 TABLET SUBLINGUAL at 10:38

## 2024-09-24 RX ADMIN — PIOGLITAZONE 30 MG: 30 TABLET ORAL at 12:28

## 2024-09-24 RX ADMIN — ROSUVASTATIN 10 MG: 10 TABLET, FILM COATED ORAL at 20:58

## 2024-09-24 RX ADMIN — LIDOCAINE HYDROCHLORIDE 50 MG: 10 INJECTION, SOLUTION INFILTRATION; PERINEURAL at 08:10

## 2024-09-24 RX ADMIN — ACETAMINOPHEN 650 MG: 325 TABLET ORAL at 12:28

## 2024-09-24 RX ADMIN — GLYCOPYRROLATE 0.2 MG: 0.2 INJECTION, SOLUTION INTRAMUSCULAR; INTRAVENOUS at 08:57

## 2024-09-24 RX ADMIN — PROPOFOL 160 MCG/KG/MIN: 10 INJECTION, EMULSION INTRAVENOUS at 08:30

## 2024-09-24 RX ADMIN — SODIUM CHLORIDE: 9 INJECTION, SOLUTION INTRAVENOUS at 22:59

## 2024-09-24 RX ADMIN — TRANEXAMIC ACID 1000 MG: 1 INJECTION, SOLUTION INTRAVENOUS at 08:37

## 2024-09-24 RX ADMIN — SODIUM CHLORIDE, POTASSIUM CHLORIDE, SODIUM LACTATE AND CALCIUM CHLORIDE: 600; 310; 30; 20 INJECTION, SOLUTION INTRAVENOUS at 06:53

## 2024-09-24 RX ADMIN — SODIUM CHLORIDE, SODIUM LACTATE, POTASSIUM CHLORIDE, AND CALCIUM CHLORIDE: 600; 310; 30; 20 INJECTION, SOLUTION INTRAVENOUS at 09:27

## 2024-09-24 RX ADMIN — SODIUM CHLORIDE, SODIUM LACTATE, POTASSIUM CHLORIDE, AND CALCIUM CHLORIDE: 600; 310; 30; 20 INJECTION, SOLUTION INTRAVENOUS at 08:10

## 2024-09-24 RX ADMIN — CEFTRIAXONE SODIUM 1000 MG: 1 INJECTION, POWDER, FOR SOLUTION INTRAMUSCULAR; INTRAVENOUS at 08:35

## 2024-09-24 RX ADMIN — TRANEXAMIC ACID 1000 MG: 1 INJECTION, SOLUTION INTRAVENOUS at 10:05

## 2024-09-24 ASSESSMENT — PAIN SCALES - GENERAL
PAINLEVEL_OUTOF10: 1
PAINLEVEL_OUTOF10: 2
PAINLEVEL_OUTOF10: 0

## 2024-09-24 ASSESSMENT — PAIN DESCRIPTION - ONSET: ONSET: GRADUAL

## 2024-09-24 ASSESSMENT — PAIN DESCRIPTION - LOCATION: LOCATION: PENIS

## 2024-09-24 ASSESSMENT — PAIN DESCRIPTION - FREQUENCY: FREQUENCY: INTERMITTENT

## 2024-09-24 ASSESSMENT — PAIN DESCRIPTION - DESCRIPTORS: DESCRIPTORS: ACHING

## 2024-09-24 ASSESSMENT — LIFESTYLE VARIABLES: SMOKING_STATUS: 0

## 2024-09-24 ASSESSMENT — PAIN DESCRIPTION - DIRECTION: RADIATING_TOWARDS: NO

## 2024-09-24 ASSESSMENT — PAIN - FUNCTIONAL ASSESSMENT
PAIN_FUNCTIONAL_ASSESSMENT: NONE - DENIES PAIN
PAIN_FUNCTIONAL_ASSESSMENT: ACTIVITIES ARE NOT PREVENTED

## 2024-09-24 ASSESSMENT — PAIN DESCRIPTION - PAIN TYPE: TYPE: SURGICAL PAIN

## 2024-09-25 VITALS
RESPIRATION RATE: 16 BRPM | OXYGEN SATURATION: 97 % | DIASTOLIC BLOOD PRESSURE: 67 MMHG | BODY MASS INDEX: 29.73 KG/M2 | SYSTOLIC BLOOD PRESSURE: 170 MMHG | HEIGHT: 66 IN | WEIGHT: 185 LBS | HEART RATE: 68 BPM | TEMPERATURE: 97.5 F

## 2024-09-25 LAB
ANION GAP SERPL CALCULATED.3IONS-SCNC: 9 MMOL/L (ref 7–19)
BASOPHILS # BLD: 0 K/UL (ref 0–0.2)
BASOPHILS NFR BLD: 0.3 % (ref 0–1)
BUN SERPL-MCNC: 11 MG/DL (ref 8–23)
CALCIUM SERPL-MCNC: 8 MG/DL (ref 8.8–10.2)
CHLORIDE SERPL-SCNC: 108 MMOL/L (ref 98–111)
CO2 SERPL-SCNC: 22 MMOL/L (ref 22–29)
CREAT SERPL-MCNC: 0.6 MG/DL (ref 0.7–1.2)
EOSINOPHIL # BLD: 0.2 K/UL (ref 0–0.6)
EOSINOPHIL NFR BLD: 3.6 % (ref 0–5)
ERYTHROCYTE [DISTWIDTH] IN BLOOD BY AUTOMATED COUNT: 13.4 % (ref 11.5–14.5)
GLUCOSE BLD-MCNC: 111 MG/DL (ref 70–99)
GLUCOSE BLD-MCNC: 78 MG/DL (ref 70–99)
GLUCOSE SERPL-MCNC: 73 MG/DL (ref 70–99)
HCT VFR BLD AUTO: 32.9 % (ref 42–52)
HGB BLD-MCNC: 10.4 G/DL (ref 14–18)
IMM GRANULOCYTES # BLD: 0 K/UL
LYMPHOCYTES # BLD: 0.6 K/UL (ref 1.1–4.5)
LYMPHOCYTES NFR BLD: 10.9 % (ref 20–40)
MCH RBC QN AUTO: 30 PG (ref 27–31)
MCHC RBC AUTO-ENTMCNC: 31.6 G/DL (ref 33–37)
MCV RBC AUTO: 94.8 FL (ref 80–94)
MONOCYTES # BLD: 0.5 K/UL (ref 0–0.9)
MONOCYTES NFR BLD: 9 % (ref 0–10)
NEUTROPHILS # BLD: 4.5 K/UL (ref 1.5–7.5)
NEUTS SEG NFR BLD: 75.9 % (ref 50–65)
PERFORMED ON: ABNORMAL
PERFORMED ON: NORMAL
PLATELET # BLD AUTO: 156 K/UL (ref 130–400)
PMV BLD AUTO: 9.9 FL (ref 9.4–12.4)
POTASSIUM SERPL-SCNC: 4.6 MMOL/L (ref 3.5–5)
RBC # BLD AUTO: 3.47 M/UL (ref 4.7–6.1)
SODIUM SERPL-SCNC: 139 MMOL/L (ref 136–145)
WBC # BLD AUTO: 5.9 K/UL (ref 4.8–10.8)

## 2024-09-25 PROCEDURE — 6360000002 HC RX W HCPCS: Performed by: UROLOGY

## 2024-09-25 PROCEDURE — 36415 COLL VENOUS BLD VENIPUNCTURE: CPT

## 2024-09-25 PROCEDURE — 97161 PT EVAL LOW COMPLEX 20 MIN: CPT

## 2024-09-25 PROCEDURE — 2580000003 HC RX 258: Performed by: UROLOGY

## 2024-09-25 PROCEDURE — 6370000000 HC RX 637 (ALT 250 FOR IP): Performed by: UROLOGY

## 2024-09-25 PROCEDURE — G0378 HOSPITAL OBSERVATION PER HR: HCPCS

## 2024-09-25 PROCEDURE — 99239 HOSP IP/OBS DSCHRG MGMT >30: CPT | Performed by: NURSE PRACTITIONER

## 2024-09-25 PROCEDURE — 80048 BASIC METABOLIC PNL TOTAL CA: CPT

## 2024-09-25 PROCEDURE — 85025 COMPLETE CBC W/AUTO DIFF WBC: CPT

## 2024-09-25 PROCEDURE — 82962 GLUCOSE BLOOD TEST: CPT

## 2024-09-25 RX ORDER — OXYCODONE AND ACETAMINOPHEN 5; 325 MG/1; MG/1
1 TABLET ORAL EVERY 6 HOURS PRN
Qty: 12 TABLET | Refills: 0 | Status: SHIPPED | OUTPATIENT
Start: 2024-09-25 | End: 2024-09-28

## 2024-09-25 RX ORDER — DOCUSATE SODIUM 100 MG/1
100 CAPSULE, LIQUID FILLED ORAL 2 TIMES DAILY PRN
Qty: 60 CAPSULE | Refills: 0 | Status: SHIPPED | OUTPATIENT
Start: 2024-09-25

## 2024-09-25 RX ORDER — TROSPIUM CHLORIDE 20 MG/1
20 TABLET, FILM COATED ORAL 2 TIMES DAILY PRN
Qty: 14 TABLET | Refills: 0 | Status: SHIPPED | OUTPATIENT
Start: 2024-09-25

## 2024-09-25 RX ORDER — NEOMYCIN/BACITRACIN/POLYMYXINB 3.5-400-5K
OINTMENT (GRAM) TOPICAL
COMMUNITY
Start: 2024-09-25

## 2024-09-25 RX ORDER — PHENAZOPYRIDINE HYDROCHLORIDE 200 MG/1
200 TABLET, FILM COATED ORAL 3 TIMES DAILY PRN
Qty: 30 TABLET | Refills: 0 | Status: SHIPPED | OUTPATIENT
Start: 2024-09-25 | End: 2024-10-05

## 2024-09-25 RX ORDER — LEVOFLOXACIN 500 MG/1
500 TABLET, FILM COATED ORAL DAILY
Qty: 7 TABLET | Refills: 0 | Status: SHIPPED | OUTPATIENT
Start: 2024-09-25 | End: 2024-10-02

## 2024-09-25 RX ADMIN — THERA TABS 1 TABLET: TAB at 09:03

## 2024-09-25 RX ADMIN — Medication 400 MG: at 09:02

## 2024-09-25 RX ADMIN — WATER 1000 MG: 1 INJECTION INTRAMUSCULAR; INTRAVENOUS; SUBCUTANEOUS at 09:03

## 2024-09-25 RX ADMIN — PIOGLITAZONE 30 MG: 30 TABLET ORAL at 09:02

## 2024-09-25 RX ADMIN — METOPROLOL TARTRATE 25 MG: 25 TABLET, FILM COATED ORAL at 09:02

## 2024-09-25 RX ADMIN — DOCUSATE SODIUM 100 MG: 100 CAPSULE, LIQUID FILLED ORAL at 09:02

## 2024-09-25 RX ADMIN — ACETAMINOPHEN 650 MG: 325 TABLET ORAL at 05:48

## 2024-09-25 RX ADMIN — PANTOPRAZOLE SODIUM 40 MG: 40 TABLET, DELAYED RELEASE ORAL at 05:48

## 2024-09-25 RX ADMIN — ACETAMINOPHEN 650 MG: 325 TABLET ORAL at 11:53

## 2024-09-25 RX ADMIN — TROSPIUM CHLORIDE 20 MG: 20 TABLET, FILM COATED ORAL at 05:48

## 2024-09-25 RX ADMIN — VERAPAMIL HYDROCHLORIDE 120 MG: 80 TABLET ORAL at 09:02

## 2024-09-25 ASSESSMENT — PAIN SCALES - GENERAL: PAINLEVEL_OUTOF10: 0

## 2024-09-25 ASSESSMENT — PAIN DESCRIPTION - LOCATION: LOCATION: PENIS

## 2024-10-01 ENCOUNTER — OFFICE VISIT (OUTPATIENT)
Dept: UROLOGY | Age: 74
End: 2024-10-01
Payer: MEDICARE

## 2024-10-01 VITALS — TEMPERATURE: 97.2 F | WEIGHT: 189.9 LBS | HEIGHT: 66 IN | BODY MASS INDEX: 30.52 KG/M2

## 2024-10-01 DIAGNOSIS — R33.8 BENIGN PROSTATIC HYPERPLASIA WITH URINARY RETENTION: Primary | ICD-10-CM

## 2024-10-01 DIAGNOSIS — N40.1 BENIGN PROSTATIC HYPERPLASIA WITH URINARY RETENTION: Primary | ICD-10-CM

## 2024-10-01 PROCEDURE — 51700 IRRIGATION OF BLADDER: CPT | Performed by: NURSE PRACTITIONER

## 2024-10-01 PROCEDURE — G8484 FLU IMMUNIZE NO ADMIN: HCPCS | Performed by: NURSE PRACTITIONER

## 2024-10-01 PROCEDURE — 1123F ACP DISCUSS/DSCN MKR DOCD: CPT | Performed by: NURSE PRACTITIONER

## 2024-10-01 PROCEDURE — 1036F TOBACCO NON-USER: CPT | Performed by: NURSE PRACTITIONER

## 2024-10-01 PROCEDURE — G8417 CALC BMI ABV UP PARAM F/U: HCPCS | Performed by: NURSE PRACTITIONER

## 2024-10-01 PROCEDURE — G8427 DOCREV CUR MEDS BY ELIG CLIN: HCPCS | Performed by: NURSE PRACTITIONER

## 2024-10-01 PROCEDURE — 3017F COLORECTAL CA SCREEN DOC REV: CPT | Performed by: NURSE PRACTITIONER

## 2024-10-01 PROCEDURE — 99213 OFFICE O/P EST LOW 20 MIN: CPT | Performed by: NURSE PRACTITIONER

## 2024-10-01 ASSESSMENT — ENCOUNTER SYMPTOMS
BACK PAIN: 0
NAUSEA: 0
ABDOMINAL PAIN: 0
VOMITING: 0
ABDOMINAL DISTENTION: 0

## 2024-10-01 NOTE — PROGRESS NOTES
Zoey Alanis is a 74 y.o. male who presents today   Chief Complaint   Patient presents with    Follow-up     I am here today for my voiding trial post AA       Patient 74-year-old male who presents to clinic today for a voiding trial.  Patient recently underwent aqua ablation on 9/24/2024 due to failed medical therapy despite maximum dose of Flomax and urinary tension.  He had a very large prostate measuring 138 g with trilobar prostatic enlargement ball-valve type large median lobe.  He did well postoperatively.  He has been holding his aspirin and is currently maintained on Levaquin denies any issues with bowel movements.  He has been having some bladder spasms and has been taking trospium twice daily scheduled since surgery.  Comes in today for voiding trial.  Pathology negative    Flomax was discontinued postoperatively however patient has continued on Flomax.    Past Medical History:   Diagnosis Date    CAD (coronary artery disease)     History of adenomatous polyp of colon     Hyperlipidemia     Hypertension     Type II or unspecified type diabetes mellitus without mention of complication, not stated as uncontrolled        Past Surgical History:   Procedure Laterality Date    COLONOSCOPY  05/18/2009    Dr Galvan-Diverticulosis, Tubulo-hyperplastic polyp, 3 yr recall    COLONOSCOPY  01/28/2013    Dr Mckeon/ablation-TVA, 3 yr recall    COLONOSCOPY  03/11/2016    Dr GRETA Fulton-diverticular disease, 5 yr recall    COLONOSCOPY N/A 03/29/2021    Dr GRETA Vela/tattoo placement-Diverticular disease-AP x 1, 1 yr recall    COLONOSCOPY  03/29/2021    Dr GRETA Vela/tattoo placement-Diverticular disease-AP x 1, 1 yr recall    COLONOSCOPY N/A 03/28/2022    Dr GRETA Fulton-Diverticular disease, tattoo in the rectum, no residual polyp/lesion seen, lipoma in right colon, 5 yr recall    CORONARY ARTERY BYPASS GRAFT      3 bypasses    CYSTOSCOPY N/A 9/24/2024    AQUABLATION OF PROSTATE (138g) performed by Robert Keita,

## 2024-10-02 NOTE — PROGRESS NOTES
Patient of KATI More presents today for voiding trial post AQUABLATION. The patient denies any fever, chills or  N&V. After patient had given consent, using the catheter in place,500ml of sterile water was used to hand irrigate. Then  240cc of sterile water was installed into the bladder with no complications. Patient was unable to void.     KATI More was in office at time of procedure.     Procedure Note (10/2/24):  After receiving verbal orders from KATI More, I was instructed to place urethral benjamin catheter. Using sterile technique, patient was cleaned with Hibiclens and sterile water solution. A 20f coude-silicone catheter was inserted into the bladder, bladder was drained of 500cc of urine, 10 mL of sterile water was used to fill up the balloon.  The catheter was then hooked up to a drainage bag.   The patient tolerated the procedure well.    Patient should follow up as scheduled.

## 2024-10-04 ENCOUNTER — OFFICE VISIT (OUTPATIENT)
Dept: UROLOGY | Age: 74
End: 2024-10-04
Payer: MEDICARE

## 2024-10-04 VITALS — BODY MASS INDEX: 30.63 KG/M2 | HEIGHT: 66 IN | TEMPERATURE: 97.2 F | WEIGHT: 190.6 LBS

## 2024-10-04 DIAGNOSIS — R33.8 BENIGN PROSTATIC HYPERPLASIA WITH URINARY RETENTION: Primary | ICD-10-CM

## 2024-10-04 DIAGNOSIS — N40.1 BENIGN PROSTATIC HYPERPLASIA WITH URINARY RETENTION: Primary | ICD-10-CM

## 2024-10-04 PROCEDURE — 1036F TOBACCO NON-USER: CPT | Performed by: NURSE PRACTITIONER

## 2024-10-04 PROCEDURE — G8417 CALC BMI ABV UP PARAM F/U: HCPCS | Performed by: NURSE PRACTITIONER

## 2024-10-04 PROCEDURE — G8427 DOCREV CUR MEDS BY ELIG CLIN: HCPCS | Performed by: NURSE PRACTITIONER

## 2024-10-04 PROCEDURE — 3017F COLORECTAL CA SCREEN DOC REV: CPT | Performed by: NURSE PRACTITIONER

## 2024-10-04 PROCEDURE — 99214 OFFICE O/P EST MOD 30 MIN: CPT | Performed by: NURSE PRACTITIONER

## 2024-10-04 PROCEDURE — 1123F ACP DISCUSS/DSCN MKR DOCD: CPT | Performed by: NURSE PRACTITIONER

## 2024-10-04 PROCEDURE — G8484 FLU IMMUNIZE NO ADMIN: HCPCS | Performed by: NURSE PRACTITIONER

## 2024-10-04 ASSESSMENT — ENCOUNTER SYMPTOMS
VOMITING: 0
CONSTIPATION: 0
ABDOMINAL DISTENTION: 0
NAUSEA: 0
ABDOMINAL PAIN: 0

## 2024-10-04 NOTE — PROGRESS NOTES
Zoey Alanis is a 74 y.o., male, Established patient who presents today   Chief Complaint   Patient presents with    Follow-up     I am here today for my voiding trial.     Patient presents for repeat voiding trial after initial failure on 10/1/2024.  He has been utilizing trospium scheduled, so he returns today after complete discontinuation of the medication.  He underwent Aquablation on 9/24/2024.  He had a very large prostate measuring 138 g with trilobar prostatic enlargement and a ball-valve type large median lobe.  He does remain on Flomax which he did not discontinue after hospitalization.    Of note, patient has failed multiple voiding trials since August and has not been catheter free since that time.  He does have a history of type 2 diabetes.  Possible concern for a component of atonic bladder.    REVIEW OF SYSTEMS:  Review of Systems   Constitutional:  Negative for chills and fever.   Gastrointestinal:  Negative for abdominal distention, abdominal pain, constipation, nausea and vomiting.   Genitourinary:  Positive for hematuria.   Psychiatric/Behavioral:  Negative for agitation and confusion.        PHYSICAL EXAM:  Temp 97.2 °F (36.2 °C) (Temporal)   Ht 1.676 m (5' 6\")   Wt 86.5 kg (190 lb 9.6 oz)   BMI 30.76 kg/m²   Physical Exam  Vitals and nursing note reviewed.   Constitutional:       General: He is not in acute distress.     Appearance: Normal appearance. He is not ill-appearing.   Pulmonary:      Effort: Pulmonary effort is normal. No respiratory distress.   Abdominal:      General: There is no distension.      Tenderness: There is no abdominal tenderness. There is no right CVA tenderness or left CVA tenderness.   Neurological:      Mental Status: He is alert and oriented to person, place, and time. Mental status is at baseline.   Psychiatric:         Mood and Affect: Mood normal.         Behavior: Behavior normal.         ASSESSMENT/PLAN  1. Benign prostatic hyperplasia with urinary

## 2024-10-05 ENCOUNTER — HOSPITAL ENCOUNTER (INPATIENT)
Age: 74
LOS: 1 days | Discharge: HOME | DRG: 641 | End: 2024-10-07
Attending: STUDENT IN AN ORGANIZED HEALTH CARE EDUCATION/TRAINING PROGRAM | Admitting: STUDENT IN AN ORGANIZED HEALTH CARE EDUCATION/TRAINING PROGRAM
Payer: MEDICARE

## 2024-10-05 DIAGNOSIS — E87.1 HYPONATREMIA: Primary | ICD-10-CM

## 2024-10-05 DIAGNOSIS — R33.9 URINARY RETENTION: ICD-10-CM

## 2024-10-05 DIAGNOSIS — R63.1 PSYCHOGENIC POLYDIPSIA: ICD-10-CM

## 2024-10-05 DIAGNOSIS — F54 PSYCHOGENIC POLYDIPSIA: ICD-10-CM

## 2024-10-05 LAB
ABO/RH: NORMAL
ALBUMIN SERPL-MCNC: 3.6 G/DL (ref 3.5–5.2)
ALP SERPL-CCNC: 69 U/L (ref 40–129)
ALT SERPL-CCNC: 6 U/L (ref 5–41)
ANION GAP SERPL CALCULATED.3IONS-SCNC: 8 MMOL/L (ref 7–19)
ANTIBODY SCREEN: NORMAL
APTT PPP: 26.3 SEC (ref 26–36.2)
AST SERPL-CCNC: 17 U/L (ref 5–40)
BACTERIA #/AREA URNS HPF: ABNORMAL /HPF
BASOPHILS # BLD: 0 K/UL (ref 0–0.2)
BASOPHILS NFR BLD: 0.2 % (ref 0–1)
BILIRUB SERPL-MCNC: 0.4 MG/DL (ref 0.2–1.2)
BILIRUB UR QL STRIP: ABNORMAL
BUN SERPL-MCNC: 8 MG/DL (ref 8–23)
CALCIUM SERPL-MCNC: 8 MG/DL (ref 8.8–10.2)
CHLORIDE SERPL-SCNC: 89 MMOL/L (ref 98–111)
CLARITY UR: ABNORMAL
CO2 SERPL-SCNC: 21 MMOL/L (ref 22–29)
COLOR UR: ABNORMAL
CREAT SERPL-MCNC: 0.6 MG/DL (ref 0.7–1.2)
EOSINOPHIL # BLD: 0.1 K/UL (ref 0–0.6)
EOSINOPHIL NFR BLD: 0.9 % (ref 0–5)
ERYTHROCYTE [DISTWIDTH] IN BLOOD BY AUTOMATED COUNT: 13.1 % (ref 11.5–14.5)
FERRITIN SERPL-MCNC: 45 NG/ML (ref 30–400)
FOLATE SERPL-MCNC: >20 NG/ML (ref 4.5–32.2)
GLUCOSE BLD-MCNC: 154 MG/DL (ref 70–99)
GLUCOSE BLD-MCNC: 164 MG/DL (ref 70–99)
GLUCOSE SERPL-MCNC: 162 MG/DL (ref 70–99)
GLUCOSE UR STRIP.AUTO-MCNC: NEGATIVE MG/DL
HAPTOGLOB SERPL-MCNC: 243 MG/DL (ref 30–200)
HCT VFR BLD AUTO: 27 % (ref 42–52)
HCT VFR BLD AUTO: 27.3 % (ref 42–52)
HGB BLD-MCNC: 8.8 G/DL (ref 14–18)
HGB UR STRIP.AUTO-MCNC: ABNORMAL MG/L
IMM GRANULOCYTES # BLD: 0 K/UL
INR PPP: 1.1 (ref 0.88–1.18)
IRON SATN MFR SERPL: 16 % (ref 14–50)
IRON SERPL-MCNC: 36 UG/DL (ref 59–158)
KETONES UR STRIP.AUTO-MCNC: 15 MG/DL
LDH SERPL-CCNC: 154 U/L (ref 91–215)
LEUKOCYTE ESTERASE UR QL STRIP.AUTO: ABNORMAL
LIPASE SERPL-CCNC: 25 U/L (ref 13–60)
LYMPHOCYTES # BLD: 0.7 K/UL (ref 1.1–4.5)
LYMPHOCYTES NFR BLD: 8.6 % (ref 20–40)
MCH RBC QN AUTO: 30 PG (ref 27–31)
MCHC RBC AUTO-ENTMCNC: 32.6 G/DL (ref 33–37)
MCV RBC AUTO: 92.2 FL (ref 80–94)
MONOCYTES # BLD: 0.4 K/UL (ref 0–0.9)
MONOCYTES NFR BLD: 5.3 % (ref 0–10)
NEUTROPHILS # BLD: 6.9 K/UL (ref 1.5–7.5)
NEUTS SEG NFR BLD: 84.5 % (ref 50–65)
NITRITE UR QL STRIP.AUTO: NEGATIVE
OSMOLALITY SERPL CALC.SUM OF ELEC: 250 MOSM/KG (ref 275–300)
OSMOLALITY URINE: 534 MOSM/KG (ref 250–1200)
PERFORMED ON: ABNORMAL
PERFORMED ON: ABNORMAL
PH UR STRIP.AUTO: 5.5 [PH] (ref 5–8)
PLATELET # BLD AUTO: 198 K/UL (ref 130–400)
PMV BLD AUTO: 8.8 FL (ref 9.4–12.4)
POTASSIUM SERPL-SCNC: 3.9 MMOL/L (ref 3.5–5)
PROT SERPL-MCNC: 5.9 G/DL (ref 6.4–8.3)
PROT UR STRIP.AUTO-MCNC: 100 MG/DL
PROTHROMBIN TIME: 13.9 SEC (ref 12–14.6)
RBC # BLD AUTO: 2.93 M/UL (ref 4.7–6.1)
RBC #/AREA URNS HPF: ABNORMAL /HPF (ref 0–2)
RETICS # AUTO: 0.09 M/UL (ref 0.03–0.12)
RETICS/RBC NFR: 3.14 % (ref 0.5–1.5)
SODIUM SERPL-SCNC: 118 MMOL/L (ref 136–145)
SODIUM SERPL-SCNC: 123 MMOL/L (ref 136–145)
SODIUM SERPL-SCNC: 123 MMOL/L (ref 136–145)
SODIUM UR-SCNC: 143 MMOL/L
SP GR UR STRIP.AUTO: 1.02 (ref 1–1.03)
TIBC SERPL-MCNC: 231 UG/DL (ref 250–400)
TSH SERPL DL<=0.005 MIU/L-ACNC: 1.52 UIU/ML (ref 0.27–4.2)
UROBILINOGEN UR STRIP.AUTO-MCNC: 0.2 E.U./DL
VIT B12 SERPL-MCNC: 910 PG/ML (ref 232–1245)
WBC # BLD AUTO: 8.1 K/UL (ref 4.8–10.8)
WBC #/AREA URNS HPF: ABNORMAL /HPF (ref 0–5)

## 2024-10-05 PROCEDURE — 80053 COMPREHEN METABOLIC PANEL: CPT

## 2024-10-05 PROCEDURE — 82728 ASSAY OF FERRITIN: CPT

## 2024-10-05 PROCEDURE — 86900 BLOOD TYPING SEROLOGIC ABO: CPT

## 2024-10-05 PROCEDURE — 84300 ASSAY OF URINE SODIUM: CPT

## 2024-10-05 PROCEDURE — 86850 RBC ANTIBODY SCREEN: CPT

## 2024-10-05 PROCEDURE — G0378 HOSPITAL OBSERVATION PER HR: HCPCS

## 2024-10-05 PROCEDURE — 83690 ASSAY OF LIPASE: CPT

## 2024-10-05 PROCEDURE — 2580000003 HC RX 258: Performed by: STUDENT IN AN ORGANIZED HEALTH CARE EDUCATION/TRAINING PROGRAM

## 2024-10-05 PROCEDURE — 36415 COLL VENOUS BLD VENIPUNCTURE: CPT

## 2024-10-05 PROCEDURE — 85610 PROTHROMBIN TIME: CPT

## 2024-10-05 PROCEDURE — 96374 THER/PROPH/DIAG INJ IV PUSH: CPT

## 2024-10-05 PROCEDURE — 84443 ASSAY THYROID STIM HORMONE: CPT

## 2024-10-05 PROCEDURE — 82962 GLUCOSE BLOOD TEST: CPT

## 2024-10-05 PROCEDURE — 83930 ASSAY OF BLOOD OSMOLALITY: CPT

## 2024-10-05 PROCEDURE — 6360000002 HC RX W HCPCS: Performed by: STUDENT IN AN ORGANIZED HEALTH CARE EDUCATION/TRAINING PROGRAM

## 2024-10-05 PROCEDURE — 84295 ASSAY OF SERUM SODIUM: CPT

## 2024-10-05 PROCEDURE — 86901 BLOOD TYPING SEROLOGIC RH(D): CPT

## 2024-10-05 PROCEDURE — 2580000003 HC RX 258: Performed by: NURSE PRACTITIONER

## 2024-10-05 PROCEDURE — 83935 ASSAY OF URINE OSMOLALITY: CPT

## 2024-10-05 PROCEDURE — 81001 URINALYSIS AUTO W/SCOPE: CPT

## 2024-10-05 PROCEDURE — 6370000000 HC RX 637 (ALT 250 FOR IP): Performed by: NURSE PRACTITIONER

## 2024-10-05 PROCEDURE — 99285 EMERGENCY DEPT VISIT HI MDM: CPT

## 2024-10-05 PROCEDURE — 85730 THROMBOPLASTIN TIME PARTIAL: CPT

## 2024-10-05 PROCEDURE — 83540 ASSAY OF IRON: CPT

## 2024-10-05 PROCEDURE — 85045 AUTOMATED RETICULOCYTE COUNT: CPT

## 2024-10-05 PROCEDURE — 82746 ASSAY OF FOLIC ACID SERUM: CPT

## 2024-10-05 PROCEDURE — 83615 LACTATE (LD) (LDH) ENZYME: CPT

## 2024-10-05 PROCEDURE — 83550 IRON BINDING TEST: CPT

## 2024-10-05 PROCEDURE — 85025 COMPLETE CBC W/AUTO DIFF WBC: CPT

## 2024-10-05 PROCEDURE — 82607 VITAMIN B-12: CPT

## 2024-10-05 PROCEDURE — 83010 ASSAY OF HAPTOGLOBIN QUANT: CPT

## 2024-10-05 RX ORDER — SODIUM CHLORIDE 0.9 % (FLUSH) 0.9 %
5-40 SYRINGE (ML) INJECTION EVERY 12 HOURS SCHEDULED
Status: DISCONTINUED | OUTPATIENT
Start: 2024-10-05 | End: 2024-10-07 | Stop reason: HOSPADM

## 2024-10-05 RX ORDER — ONDANSETRON 4 MG/1
4 TABLET, ORALLY DISINTEGRATING ORAL EVERY 8 HOURS PRN
Status: DISCONTINUED | OUTPATIENT
Start: 2024-10-05 | End: 2024-10-07 | Stop reason: HOSPADM

## 2024-10-05 RX ORDER — INSULIN LISPRO 100 [IU]/ML
0-4 INJECTION, SOLUTION INTRAVENOUS; SUBCUTANEOUS
Status: DISCONTINUED | OUTPATIENT
Start: 2024-10-05 | End: 2024-10-07 | Stop reason: HOSPADM

## 2024-10-05 RX ORDER — ACETAMINOPHEN 650 MG/1
650 SUPPOSITORY RECTAL EVERY 6 HOURS PRN
Status: DISCONTINUED | OUTPATIENT
Start: 2024-10-05 | End: 2024-10-07 | Stop reason: HOSPADM

## 2024-10-05 RX ORDER — ACETAMINOPHEN 325 MG/1
650 TABLET ORAL EVERY 6 HOURS PRN
Status: DISCONTINUED | OUTPATIENT
Start: 2024-10-05 | End: 2024-10-07 | Stop reason: HOSPADM

## 2024-10-05 RX ORDER — SODIUM CHLORIDE 0.9 % (FLUSH) 0.9 %
5-40 SYRINGE (ML) INJECTION PRN
Status: DISCONTINUED | OUTPATIENT
Start: 2024-10-05 | End: 2024-10-07 | Stop reason: HOSPADM

## 2024-10-05 RX ORDER — ENOXAPARIN SODIUM 100 MG/ML
40 INJECTION SUBCUTANEOUS DAILY
Status: DISCONTINUED | OUTPATIENT
Start: 2024-10-05 | End: 2024-10-05

## 2024-10-05 RX ORDER — INSULIN LISPRO 100 [IU]/ML
0-4 INJECTION, SOLUTION INTRAVENOUS; SUBCUTANEOUS NIGHTLY
Status: DISCONTINUED | OUTPATIENT
Start: 2024-10-05 | End: 2024-10-07 | Stop reason: HOSPADM

## 2024-10-05 RX ORDER — ONDANSETRON 2 MG/ML
4 INJECTION INTRAMUSCULAR; INTRAVENOUS EVERY 6 HOURS PRN
Status: DISCONTINUED | OUTPATIENT
Start: 2024-10-05 | End: 2024-10-07 | Stop reason: HOSPADM

## 2024-10-05 RX ORDER — POTASSIUM CHLORIDE 7.45 MG/ML
10 INJECTION INTRAVENOUS PRN
Status: DISCONTINUED | OUTPATIENT
Start: 2024-10-05 | End: 2024-10-07 | Stop reason: HOSPADM

## 2024-10-05 RX ORDER — ROSUVASTATIN CALCIUM 10 MG/1
10 TABLET, COATED ORAL NIGHTLY
Status: DISCONTINUED | OUTPATIENT
Start: 2024-10-05 | End: 2024-10-07 | Stop reason: HOSPADM

## 2024-10-05 RX ORDER — INSULIN GLARGINE 100 [IU]/ML
23 INJECTION, SOLUTION SUBCUTANEOUS NIGHTLY
Status: DISCONTINUED | OUTPATIENT
Start: 2024-10-05 | End: 2024-10-07 | Stop reason: HOSPADM

## 2024-10-05 RX ORDER — DEXTROSE MONOHYDRATE 100 MG/ML
INJECTION, SOLUTION INTRAVENOUS CONTINUOUS PRN
Status: DISCONTINUED | OUTPATIENT
Start: 2024-10-05 | End: 2024-10-07 | Stop reason: HOSPADM

## 2024-10-05 RX ORDER — POTASSIUM CHLORIDE 1500 MG/1
40 TABLET, EXTENDED RELEASE ORAL PRN
Status: DISCONTINUED | OUTPATIENT
Start: 2024-10-05 | End: 2024-10-07 | Stop reason: HOSPADM

## 2024-10-05 RX ORDER — VERAPAMIL HYDROCHLORIDE 120 MG/1
120 TABLET ORAL
Status: DISCONTINUED | OUTPATIENT
Start: 2024-10-05 | End: 2024-10-07 | Stop reason: HOSPADM

## 2024-10-05 RX ORDER — MULTIVITAMIN WITH IRON
1 TABLET ORAL DAILY
Status: DISCONTINUED | OUTPATIENT
Start: 2024-10-05 | End: 2024-10-07 | Stop reason: HOSPADM

## 2024-10-05 RX ORDER — POLYETHYLENE GLYCOL 3350 17 G/17G
17 POWDER, FOR SOLUTION ORAL DAILY PRN
Status: DISCONTINUED | OUTPATIENT
Start: 2024-10-05 | End: 2024-10-07 | Stop reason: HOSPADM

## 2024-10-05 RX ORDER — METOPROLOL TARTRATE 25 MG/1
25 TABLET, FILM COATED ORAL 2 TIMES DAILY
Status: DISCONTINUED | OUTPATIENT
Start: 2024-10-05 | End: 2024-10-07 | Stop reason: HOSPADM

## 2024-10-05 RX ORDER — DOCUSATE SODIUM 100 MG/1
100 CAPSULE, LIQUID FILLED ORAL 2 TIMES DAILY PRN
Status: DISCONTINUED | OUTPATIENT
Start: 2024-10-05 | End: 2024-10-07 | Stop reason: HOSPADM

## 2024-10-05 RX ORDER — SODIUM CHLORIDE 9 MG/ML
INJECTION, SOLUTION INTRAVENOUS PRN
Status: DISCONTINUED | OUTPATIENT
Start: 2024-10-05 | End: 2024-10-07 | Stop reason: HOSPADM

## 2024-10-05 RX ORDER — MAGNESIUM SULFATE IN WATER 40 MG/ML
2000 INJECTION, SOLUTION INTRAVENOUS PRN
Status: DISCONTINUED | OUTPATIENT
Start: 2024-10-05 | End: 2024-10-07 | Stop reason: HOSPADM

## 2024-10-05 RX ADMIN — INSULIN GLARGINE 23 UNITS: 100 INJECTION, SOLUTION SUBCUTANEOUS at 21:22

## 2024-10-05 RX ADMIN — SODIUM CHLORIDE, PRESERVATIVE FREE 10 ML: 5 INJECTION INTRAVENOUS at 19:41

## 2024-10-05 RX ADMIN — WATER 1000 MG: 1 INJECTION INTRAMUSCULAR; INTRAVENOUS; SUBCUTANEOUS at 16:48

## 2024-10-05 RX ADMIN — VERAPAMIL HYDROCHLORIDE 120 MG: 120 TABLET ORAL at 19:40

## 2024-10-05 RX ADMIN — METOPROLOL TARTRATE 25 MG: 25 TABLET, FILM COATED ORAL at 19:40

## 2024-10-05 RX ADMIN — ROSUVASTATIN 10 MG: 10 TABLET, FILM COATED ORAL at 19:40

## 2024-10-05 RX ADMIN — THERA TABS 1 TABLET: TAB at 21:22

## 2024-10-05 ASSESSMENT — PAIN SCALES - GENERAL
PAINLEVEL_OUTOF10: 0
PAINLEVEL_OUTOF10: 0

## 2024-10-05 NOTE — ED NOTES
ED TO INPATIENT SBAR HANDOFF    Patient Name: Zoey Alanis   : 1950  74 y.o.   Family/Caregiver Present: Yes  Code Status Order: Full Code    C-SSRS: Risk of Suicide: No Risk  Sitter No  Restraints:         Situation  Chief Complaint:   Chief Complaint   Patient presents with    Emesis     Started this AM    Urinary Retention     Can't pee today, had catheter out yesterday after a prostate ablation     Patient Diagnosis: Acute hyponatremia [E87.1]     Brief Description of Patient's Condition: Zoey Alanis is a 74 y.o. male with PMH of chronic urinary retention, diabetes, hypertension, hyperlipidemia, BPH who presents to the emergency department with CC of urinary retention.  Patient had an aqua ablation on 2024 with Dr. Keita.  He has since failed voiding trial multiple times.  He was seen in office yesterday and had his catheter removed and was initially able to pee, but throughout today has been unable to urinate.  Dr. Ellison called ahead and asked for patient to have a 20 [place]ese coudé catheter placed upon arrival to the ED.     Patient is also complaining of nausea and vomiting which started this morning.  He states that he has been drinking excessive amounts of water throughout yesterday and today to try to make himself urinate.  He has also had some diarrhea.  He denies any confusion, seizures, headaches, abdominal pain.  He had good drainage of blood-tinged urine after catheter was placed.  Mental Status: oriented and alert  Arrived from: home    Imaging:   No orders to display     COVID-19 Results:   Internal Administration   First Dose COVID-19, MODERNA BLUE border, Primary or Immunocompromised, (age 12y+), IM, 100 mcg/0.5mL  2021   Second Dose COVID-19, MODERNA BLUE border, Primary or Immunocompromised, (age 12y+), IM, 100 mcg/0.5mL   2021       Last COVID Lab SARS-CoV-2, PCR (no units)   Date Value   2022 Not Detected           Abnormal labs:   Abnormal Labs Reviewed

## 2024-10-05 NOTE — ED PROVIDER NOTES
[facility] EMERGENCY DEPT  eMERGENCY dEPARTMENT eNCOUnter      Pt Name: Zoey Alanis  MRN: [id number]  Birthdate 1950  Date of evaluation: 10/5/2024  Provider: Lynnette Alvarez MD    CHIEF COMPLAINT       Chief Complaint   Patient presents with    Emesis     Started this AM    Urinary Retention     Can't pee today, had catheter out yesterday after a prostate ablation         HISTORY OF PRESENT ILLNESS   (Location/Symptom, Timing/Onset,Context/Setting, Quality, Duration, Modifying Factors, Severity)  Note limiting factors.     HPI    Zoey Alanis is a 74 y.o. male with PMH of chronic urinary retention, diabetes, hypertension, hyperlipidemia, BPH who presents to the emergency department with CC of urinary retention.  Patient had an aqua ablation on 9/24/2024 with Dr. Keita.  He has since failed voiding trial multiple times.  He was seen in office yesterday and had his catheter removed and was initially able to pee, but throughout today has been unable to urinate.  Dr. Ellison called ahead and asked for patient to have a 20 Urdu coudé catheter placed upon arrival to the ED.    Patient is also complaining of nausea and vomiting which started this morning.  He states that he has been drinking excessive amounts of water throughout yesterday and today to try to make himself urinate.  He has also had some diarrhea.  He denies any confusion, seizures, headaches, abdominal pain.  He had good drainage of blood-tinged urine after catheter was placed.    NursingNotes were reviewed.    REVIEW OF SYSTEMS    (2-9 systems for level 4, 10 or more for level 5)     Review of Systems   Constitutional:  Negative for appetite change, chills, fatigue and fever.   HENT:  Negative for congestion and sore throat.    Eyes:  Negative for pain and redness.   Respiratory:  Negative for cough, chest tightness and shortness of breath.    Cardiovascular:  Negative for chest pain and leg swelling.   Gastrointestinal:  Positive for nausea and

## 2024-10-05 NOTE — PLAN OF CARE
Problem: Chronic Conditions and Co-morbidities  Goal: Patient's chronic conditions and co-morbidity symptoms are monitored and maintained or improved  10/5/2024 1755 by Jennifer Lomeli RN  Outcome: Progressing  10/5/2024 1755 by Jennifer Lomeli, RN  Outcome: Progressing     Problem: Discharge Planning  Goal: Discharge to home or other facility with appropriate resources  10/5/2024 1755 by Jennifer Lomeli, RN  Outcome: Progressing  10/5/2024 1755 by Jennifer Lomeli, RN  Outcome: Progressing

## 2024-10-05 NOTE — PLAN OF CARE
Problem: Chronic Conditions and Co-morbidities  Goal: Patient's chronic conditions and co-morbidity symptoms are monitored and maintained or improved  10/5/2024 1801 by Jennifer Lomeli RN  Outcome: Progressing  10/5/2024 1755 by Jennifer Lomeli RN  Outcome: Progressing  10/5/2024 1755 by Jennifer Lomeli RN  Outcome: Progressing     Problem: Discharge Planning  Goal: Discharge to home or other facility with appropriate resources  10/5/2024 1801 by Jennifer Lomeli RN  Outcome: Progressing  10/5/2024 1755 by Jennifer Lomeli RN  Outcome: Progressing  10/5/2024 1755 by Jennifer Lomeli RN  Outcome: Progressing     Problem: Safety - Adult  Goal: Free from fall injury  Outcome: Progressing

## 2024-10-05 NOTE — H&P
Recent Labs     10/05/24  1450   AST 17   ALT 6   BILITOT 0.4   ALKPHOS 69     Urinalysis:  Lab Results   Component Value Date/Time    NITRU Negative 10/05/2024 02:30 PM    WBCUA 3-5 10/05/2024 02:30 PM    BACTERIA None Seen 10/05/2024 02:30 PM    RBCUA 31-50 10/05/2024 02:30 PM    BLOODU LARGE 10/05/2024 02:30 PM    GLUCOSEU Negative 10/05/2024 02:30 PM     Assessment/Plan:  Principal Problem:    Acute hyponatremia  Resolved Problems:    * No resolved hospital problems. *     Principal Problem:       Acute hyponatremia  -serum sodium q4hrs  -1L/24hr fluid restriction avoid free water  -seizure precautions  -neuro checks q4hrs  -hypertonic saline if symptomatic  -tsh with reflex FT4  -serum osm/urine osm/urine sodium  -follow na closely  -avoid rapid overcorrection      Urinary retention   -consult urology as indicated   -benjamin catheter care   -I's and O's   -daily weight        Anemia              -b12/folate              -iron/tibc              -ferritin               -monitor hgb/hct              -transfuse hgb less than 7   -haptoglobin   -LDH   -pt/ptt   -type and screen   -scds for vte prophylaxis         Diabetes  -poc glucose qid  -low dose insulin coverage  -hypoglycemia orders  Resolved Problems:    * No resolved hospital problems. *  Signed:  KATI Grayson - CNP, 10/5/2024 4:35 PM

## 2024-10-06 PROBLEM — E87.1 HYPONATREMIA: Status: ACTIVE | Noted: 2024-10-06

## 2024-10-06 LAB
ANION GAP SERPL CALCULATED.3IONS-SCNC: 10 MMOL/L (ref 7–19)
BUN SERPL-MCNC: 6 MG/DL (ref 8–23)
CALCIUM SERPL-MCNC: 8.7 MG/DL (ref 8.8–10.2)
CHLORIDE SERPL-SCNC: 100 MMOL/L (ref 98–111)
CO2 SERPL-SCNC: 21 MMOL/L (ref 22–29)
CREAT SERPL-MCNC: 0.6 MG/DL (ref 0.7–1.2)
ERYTHROCYTE [DISTWIDTH] IN BLOOD BY AUTOMATED COUNT: 13.3 % (ref 11.5–14.5)
GLUCOSE BLD-MCNC: 169 MG/DL (ref 70–99)
GLUCOSE BLD-MCNC: 252 MG/DL (ref 70–99)
GLUCOSE BLD-MCNC: 257 MG/DL (ref 70–99)
GLUCOSE SERPL-MCNC: 162 MG/DL (ref 70–99)
HCT VFR BLD AUTO: 31 % (ref 42–52)
HGB BLD-MCNC: 10.3 G/DL (ref 14–18)
MCH RBC QN AUTO: 30.7 PG (ref 27–31)
MCHC RBC AUTO-ENTMCNC: 33.2 G/DL (ref 33–37)
MCV RBC AUTO: 92.5 FL (ref 80–94)
PERFORMED ON: ABNORMAL
PLATELET # BLD AUTO: 253 K/UL (ref 130–400)
PMV BLD AUTO: 9.4 FL (ref 9.4–12.4)
POTASSIUM SERPL-SCNC: 3.7 MMOL/L (ref 3.5–5)
RBC # BLD AUTO: 3.35 M/UL (ref 4.7–6.1)
SODIUM SERPL-SCNC: 131 MMOL/L (ref 136–145)
SODIUM SERPL-SCNC: 133 MMOL/L (ref 136–145)
SODIUM SERPL-SCNC: 135 MMOL/L (ref 136–145)
WBC # BLD AUTO: 7.2 K/UL (ref 4.8–10.8)

## 2024-10-06 PROCEDURE — 6370000000 HC RX 637 (ALT 250 FOR IP): Performed by: NURSE PRACTITIONER

## 2024-10-06 PROCEDURE — 99222 1ST HOSP IP/OBS MODERATE 55: CPT | Performed by: UROLOGY

## 2024-10-06 PROCEDURE — 80048 BASIC METABOLIC PNL TOTAL CA: CPT

## 2024-10-06 PROCEDURE — 82962 GLUCOSE BLOOD TEST: CPT

## 2024-10-06 PROCEDURE — 6370000000 HC RX 637 (ALT 250 FOR IP): Performed by: STUDENT IN AN ORGANIZED HEALTH CARE EDUCATION/TRAINING PROGRAM

## 2024-10-06 PROCEDURE — 36415 COLL VENOUS BLD VENIPUNCTURE: CPT

## 2024-10-06 PROCEDURE — 96372 THER/PROPH/DIAG INJ SC/IM: CPT

## 2024-10-06 PROCEDURE — 85027 COMPLETE CBC AUTOMATED: CPT

## 2024-10-06 PROCEDURE — 2580000003 HC RX 258: Performed by: NURSE PRACTITIONER

## 2024-10-06 PROCEDURE — 84295 ASSAY OF SERUM SODIUM: CPT

## 2024-10-06 PROCEDURE — 6360000002 HC RX W HCPCS: Performed by: STUDENT IN AN ORGANIZED HEALTH CARE EDUCATION/TRAINING PROGRAM

## 2024-10-06 PROCEDURE — 1200000000 HC SEMI PRIVATE

## 2024-10-06 RX ORDER — PHENAZOPYRIDINE HYDROCHLORIDE 200 MG/1
200 TABLET, FILM COATED ORAL 3 TIMES DAILY PRN
Status: DISCONTINUED | OUTPATIENT
Start: 2024-10-06 | End: 2024-10-07 | Stop reason: HOSPADM

## 2024-10-06 RX ORDER — TAMSULOSIN HYDROCHLORIDE 0.4 MG/1
0.4 CAPSULE ORAL 2 TIMES DAILY
Status: DISCONTINUED | OUTPATIENT
Start: 2024-10-06 | End: 2024-10-07 | Stop reason: HOSPADM

## 2024-10-06 RX ORDER — DESMOPRESSIN ACETATE 4 UG/ML
2 INJECTION, SOLUTION INTRAVENOUS; SUBCUTANEOUS EVERY 6 HOURS
Status: COMPLETED | OUTPATIENT
Start: 2024-10-06 | End: 2024-10-06

## 2024-10-06 RX ORDER — TAMSULOSIN HYDROCHLORIDE 0.4 MG/1
0.4 CAPSULE ORAL DAILY
Status: DISCONTINUED | OUTPATIENT
Start: 2024-10-06 | End: 2024-10-06

## 2024-10-06 RX ADMIN — METOPROLOL TARTRATE 25 MG: 25 TABLET, FILM COATED ORAL at 20:29

## 2024-10-06 RX ADMIN — TAMSULOSIN HYDROCHLORIDE 0.4 MG: 0.4 CAPSULE ORAL at 08:28

## 2024-10-06 RX ADMIN — VERAPAMIL HYDROCHLORIDE 120 MG: 120 TABLET ORAL at 08:28

## 2024-10-06 RX ADMIN — INSULIN GLARGINE 23 UNITS: 100 INJECTION, SOLUTION SUBCUTANEOUS at 20:34

## 2024-10-06 RX ADMIN — SODIUM CHLORIDE, PRESERVATIVE FREE 10 ML: 5 INJECTION INTRAVENOUS at 08:28

## 2024-10-06 RX ADMIN — THERA TABS 1 TABLET: TAB at 08:28

## 2024-10-06 RX ADMIN — ROSUVASTATIN 10 MG: 10 TABLET, FILM COATED ORAL at 20:29

## 2024-10-06 RX ADMIN — DESMOPRESSIN ACETATE 2 MCG: 4 INJECTION INTRAVENOUS; SUBCUTANEOUS at 12:42

## 2024-10-06 RX ADMIN — SODIUM CHLORIDE, PRESERVATIVE FREE 10 ML: 5 INJECTION INTRAVENOUS at 20:29

## 2024-10-06 RX ADMIN — METOPROLOL TARTRATE 25 MG: 25 TABLET, FILM COATED ORAL at 08:28

## 2024-10-06 RX ADMIN — TAMSULOSIN HYDROCHLORIDE 0.4 MG: 0.4 CAPSULE ORAL at 20:29

## 2024-10-06 RX ADMIN — INSULIN LISPRO 2 UNITS: 100 INJECTION, SOLUTION INTRAVENOUS; SUBCUTANEOUS at 12:43

## 2024-10-06 RX ADMIN — DESMOPRESSIN ACETATE 2 MCG: 4 INJECTION INTRAVENOUS; SUBCUTANEOUS at 18:20

## 2024-10-06 ASSESSMENT — PAIN SCALES - GENERAL: PAINLEVEL_OUTOF10: 0

## 2024-10-06 NOTE — PLAN OF CARE
Problem: Chronic Conditions and Co-morbidities  Goal: Patient's chronic conditions and co-morbidity symptoms are monitored and maintained or improved  Outcome: Progressing     Problem: Discharge Planning  Goal: Discharge to home or other facility with appropriate resources  Outcome: Progressing     Problem: Discharge Planning  Goal: Discharge to home or other facility with appropriate resources  Outcome: Progressing     Problem: Safety - Adult  Goal: Free from fall injury  Outcome: Progressing     Problem: Pain  Goal: Verbalizes/displays adequate comfort level or baseline comfort level  Outcome: Progressing     Problem: Safety - Adult  Goal: Free from fall injury  Outcome: Progressing

## 2024-10-06 NOTE — CONSULTS
Consult Note            Date:10/6/2024        Patient Name:Zoey Alanis     YOB: 1950     Age:74 y.o.    Inpatient consult to Urology  Consult performed by: Robert Keita MD  Consult ordered by: Sherif Tan MD  Reason for consult: Urinary retention hyponatremia          Chief Complaint     Chief Complaint   Patient presents with    Emesis     Started this AM    Urinary Retention     Can't pee today, had catheter out yesterday after a prostate ablation          History Obtained From   patient, electronic medical record    History of Present Illness   Patient 74-year-old gentleman known to me.  He has chronic urinary retention over the last 2 months despite being on max medical therapy with 0.8 mg tamsulosin he failed multiple voiding trials.  He is a diabetic.  He then underwent aqua ablation  for a 138 g prostate and urinary retention on 9/24/2024.  He is essentially failed 2 postoperative voiding trials with the last being on 10/4/2024.  He states he was unable to sense any kind of bladder fullness and was unable to void.  He was drinking lots of water and fluid but still not sensing fullness so he called and was told to go to emergency department emergency department he was found to have hyponatremia with a sodium of 118.  He was admitted by the hospitalist for management of his hyponatremia.  A Suero 20 Albanian three-way coudé tip catheter was inserted in the emergency department without difficulty.  Only volume I can see recorded as a residual was 1200 as expected his urine was a little bloody at first but is essentially cleared he has not required CBI.  His creatinine is 0.6 after some diuresis today his H&H was 10.3 and 31.  Urinalysis showed as expected blood but no bacteria.  Past Medical History     Past Medical History:   Diagnosis Date    CAD (coronary artery disease)     History of adenomatous polyp of colon     Hyperlipidemia     Hypertension     Type II or unspecified type

## 2024-10-06 NOTE — PLAN OF CARE
Problem: Chronic Conditions and Co-morbidities  Goal: Patient's chronic conditions and co-morbidity symptoms are monitored and maintained or improved  10/5/2024 2015 by Nabila Bee RN  Outcome: Progressing  Flowsheets (Taken 10/5/2024 1942)  Care Plan - Patient's Chronic Conditions and Co-Morbidity Symptoms are Monitored and Maintained or Improved: Monitor and assess patient's chronic conditions and comorbid symptoms for stability, deterioration, or improvement  10/5/2024 1801 by Jennifer Lomeli RN  Outcome: Progressing  10/5/2024 1755 by Jennifer Lomeli RN  Outcome: Progressing  10/5/2024 1755 by Jennifer Lomeli RN  Outcome: Progressing     Problem: Discharge Planning  Goal: Discharge to home or other facility with appropriate resources  10/5/2024 2015 by Nabila Bee RN  Outcome: Progressing  Flowsheets (Taken 10/5/2024 1942)  Discharge to home or other facility with appropriate resources: Identify barriers to discharge with patient and caregiver  10/5/2024 1801 by Jennifer Lomeli RN  Outcome: Progressing  10/5/2024 1755 by Jennifer Lomeli RN  Outcome: Progressing  10/5/2024 1755 by Jennifer Lomeli RN  Outcome: Progressing     Problem: Safety - Adult  Goal: Free from fall injury  10/5/2024 2015 by Nabila Bee RN  Outcome: Progressing  10/5/2024 1801 by Jennifer Lomeli RN  Outcome: Progressing     Problem: Pain  Goal: Verbalizes/displays adequate comfort level or baseline comfort level  Outcome: Progressing

## 2024-10-07 VITALS
OXYGEN SATURATION: 96 % | WEIGHT: 189.8 LBS | DIASTOLIC BLOOD PRESSURE: 58 MMHG | HEART RATE: 72 BPM | TEMPERATURE: 97 F | SYSTOLIC BLOOD PRESSURE: 165 MMHG | BODY MASS INDEX: 31.62 KG/M2 | RESPIRATION RATE: 20 BRPM | HEIGHT: 65 IN

## 2024-10-07 LAB
ANION GAP SERPL CALCULATED.3IONS-SCNC: 10 MMOL/L (ref 7–19)
BUN SERPL-MCNC: 15 MG/DL (ref 8–23)
CALCIUM SERPL-MCNC: 8.3 MG/DL (ref 8.8–10.2)
CHLORIDE SERPL-SCNC: 104 MMOL/L (ref 98–111)
CO2 SERPL-SCNC: 23 MMOL/L (ref 22–29)
CREAT SERPL-MCNC: 0.7 MG/DL (ref 0.7–1.2)
ERYTHROCYTE [DISTWIDTH] IN BLOOD BY AUTOMATED COUNT: 13.8 % (ref 11.5–14.5)
GLUCOSE BLD-MCNC: 108 MG/DL (ref 70–99)
GLUCOSE BLD-MCNC: 192 MG/DL (ref 70–99)
GLUCOSE SERPL-MCNC: 89 MG/DL (ref 70–99)
HCT VFR BLD AUTO: 28.6 % (ref 42–52)
HGB BLD-MCNC: 9.2 G/DL (ref 14–18)
MCH RBC QN AUTO: 29.7 PG (ref 27–31)
MCHC RBC AUTO-ENTMCNC: 32.2 G/DL (ref 33–37)
MCV RBC AUTO: 92.3 FL (ref 80–94)
PERFORMED ON: ABNORMAL
PERFORMED ON: ABNORMAL
PLATELET # BLD AUTO: 234 K/UL (ref 130–400)
PMV BLD AUTO: 9.1 FL (ref 9.4–12.4)
POTASSIUM SERPL-SCNC: 3.6 MMOL/L (ref 3.5–5)
RBC # BLD AUTO: 3.1 M/UL (ref 4.7–6.1)
SODIUM SERPL-SCNC: 137 MMOL/L (ref 136–145)
WBC # BLD AUTO: 4.6 K/UL (ref 4.8–10.8)

## 2024-10-07 PROCEDURE — 2580000003 HC RX 258: Performed by: NURSE PRACTITIONER

## 2024-10-07 PROCEDURE — 36415 COLL VENOUS BLD VENIPUNCTURE: CPT

## 2024-10-07 PROCEDURE — 80048 BASIC METABOLIC PNL TOTAL CA: CPT

## 2024-10-07 PROCEDURE — 94760 N-INVAS EAR/PLS OXIMETRY 1: CPT

## 2024-10-07 PROCEDURE — 6370000000 HC RX 637 (ALT 250 FOR IP): Performed by: STUDENT IN AN ORGANIZED HEALTH CARE EDUCATION/TRAINING PROGRAM

## 2024-10-07 PROCEDURE — 85027 COMPLETE CBC AUTOMATED: CPT

## 2024-10-07 PROCEDURE — 6370000000 HC RX 637 (ALT 250 FOR IP): Performed by: NURSE PRACTITIONER

## 2024-10-07 PROCEDURE — 82962 GLUCOSE BLOOD TEST: CPT

## 2024-10-07 RX ORDER — PHENAZOPYRIDINE HYDROCHLORIDE 200 MG/1
200 TABLET, FILM COATED ORAL 3 TIMES DAILY PRN
Qty: 30 TABLET | Refills: 0 | Status: SHIPPED
Start: 2024-10-07 | End: 2024-10-17

## 2024-10-07 RX ORDER — TAMSULOSIN HYDROCHLORIDE 0.4 MG/1
0.8 CAPSULE ORAL NIGHTLY
Qty: 60 CAPSULE | Refills: 0 | Status: SHIPPED
Start: 2024-10-07

## 2024-10-07 RX ADMIN — TAMSULOSIN HYDROCHLORIDE 0.4 MG: 0.4 CAPSULE ORAL at 10:07

## 2024-10-07 RX ADMIN — METOPROLOL TARTRATE 25 MG: 25 TABLET, FILM COATED ORAL at 10:07

## 2024-10-07 RX ADMIN — VERAPAMIL HYDROCHLORIDE 120 MG: 120 TABLET ORAL at 10:06

## 2024-10-07 RX ADMIN — THERA TABS 1 TABLET: TAB at 10:08

## 2024-10-07 RX ADMIN — SODIUM CHLORIDE, PRESERVATIVE FREE 10 ML: 5 INJECTION INTRAVENOUS at 10:07

## 2024-10-07 NOTE — DISCHARGE SUMMARY
Discharge Summary    NAME: Zoey Alanis  :  1950  MRN:  [id number]    Admit date:  10/5/2024  Discharge date:  10/7/2024    Admitting Physician:  Sherif Tan MD    Advance Directive: Full Code    Consults: urology    Primary Care Physician:  Andrea Mistry MD    Discharge Diagnoses:  Principal Problem:    Acute hyponatremia  Active Problems:    HTN (hypertension)    Urinary retention    Hyponatremia  Resolved Problems:    * No resolved hospital problems. *      Significant Diagnostic Studies:   No results found.    Pertinent Labs:   CBC:   Recent Labs     10/05/24  1450 10/06/24  0448 10/07/24  0242   WBC 8.1 7.2 4.6*   HGB 8.8* 10.3* 9.2*    253 234     BMP:    Recent Labs     10/05/24  1450 10/05/24  1823 10/06/24  0448 10/06/24  0811 10/06/24  1657 10/07/24  0242   *   < > 131* 135* 133* 137   K 3.9  --  3.7  --   --  3.6   CL 89*  --  100  --   --  104   CO2 21*  --  21*  --   --  23   BUN 8  --  6*  --   --  15   CREATININE 0.6*  --  0.6*  --   --  0.7   GLUCOSE 162*  --  162*  --   --  89    < > = values in this interval not displayed.     INR:   Recent Labs     10/05/24  1823   INR 1.10             Hospital Course:       74-year-old male with history of BPH with urinary retention s/p prostate aqua ablation 2024, in addition to diabetes, hypertension, CAD presenting due to inability to void since he had Suero removed day prior on 10/4 in urology clinic.  He was also recently told to increase fluid intake and noted to have severe hyponatremia sodium down to 118 on presentation due to excessive water consumption, however no acute mental status changes.  Suero catheter replaced in ED.  Some hematuria noted, however did not have any persistent gross hematuria requiring bladder irrigation.  No evidence of UTI.  Monitored serum sodium levels frequently with fluid/water restriction.  Hyponatremia improved over hospital course with p.o. water restriction.  Patient medically stable for

## 2024-10-07 NOTE — PROGRESS NOTES
Urology Attending Progress Note      Subjective: Patient with enlarged prostate and diabetes with suspected neurogenic bladder status post aqua ablation of the prostate with 2 failed trials of void.  Patient was admitted to the hospital because of hyponatremia because of increased free water intake.  Currently he feels much better and denies any state of confusion, nausea, vomiting, fever or chills.  He does not have any urethral pain or testicular pain.  His sodium is down to 137    Vitals:  BP (!) 165/58   Pulse 72   Temp 97 °F (36.1 °C) (Oral)   Resp 20   Ht 1.651 m (5' 5\")   Wt 86.1 kg (189 lb 12.8 oz)   SpO2 95%   BMI 31.58 kg/m²   Temp  Av.1 °F (36.7 °C)  Min: 97 °F (36.1 °C)  Max: 98.8 °F (37.1 °C)    Intake/Output Summary (Last 24 hours) at 10/7/2024 1114  Last data filed at 10/7/2024 1008  Gross per 24 hour   Intake 360 ml   Output 1700 ml   Net -1340 ml       Exam: Alert awake oriented x 3  Chest is normal  Abdomen is soft not distended nontender  Suero catheter is in place with no GARY cath discharge  Clear urine in Suero bag    Labs:  WBC:    Lab Results   Component Value Date/Time    WBC 4.6 10/07/2024 02:42 AM     Hemoglobin/Hematocrit:    Lab Results   Component Value Date/Time    HGB 9.2 10/07/2024 02:42 AM    HCT 28.6 10/07/2024 02:42 AM     BMP:    Lab Results   Component Value Date/Time     10/07/2024 02:42 AM    K 3.6 10/07/2024 02:42 AM     10/07/2024 02:42 AM    CO2 23 10/07/2024 02:42 AM    BUN 15 10/07/2024 02:42 AM    CREATININE 0.7 10/07/2024 02:42 AM    CALCIUM 8.3 10/07/2024 02:42 AM    LABGLOM >90 10/07/2024 02:42 AM     PT/INR:    Lab Results   Component Value Date/Time    PROTIME 13.9 10/05/2024 06:23 PM    INR 1.10 10/05/2024 06:23 PM     PTT:    Lab Results   Component Value Date/Time    APTT 26.3 10/05/2024 06:23 PM   [APTT    Urinalysis:     Urine Culture:      Blood Culture:      Antibiotic Therapy:      Imaging: ]      Impression/Plan: Patient with 
Dr. Keita on floor to evaluate patient.  Consult for urology to see placed.  Dr. Tan notified..      Suero catheter is to remain in place when DC per Dr. Keita.  Patient to follow up with Dr. Keita as scheduled  
2:50 PM   Result Value Ref Range    Sodium 118 (LL) 136 - 145 mmol/L    Potassium 3.9 3.5 - 5.0 mmol/L    Chloride 89 (L) 98 - 111 mmol/L    CO2 21 (L) 22 - 29 mmol/L    Anion Gap 8 7 - 19 mmol/L    Glucose 162 (H) 70 - 99 mg/dL    BUN 8 8 - 23 mg/dL    Creatinine 0.6 (L) 0.7 - 1.2 mg/dL    Est, Glom Filt Rate >90 >60    Calcium 8.0 (L) 8.8 - 10.2 mg/dL    Total Protein 5.9 (L) 6.4 - 8.3 g/dL    Albumin 3.6 3.5 - 5.2 g/dL    Total Bilirubin 0.4 0.2 - 1.2 mg/dL    Alkaline Phosphatase 69 40 - 129 U/L    ALT 6 5 - 41 U/L    AST 17 5 - 40 U/L   Lipase    Collection Time: 10/05/24  2:50 PM   Result Value Ref Range    Lipase 25 13 - 60 U/L   OSMOLALITY, SERUM    Collection Time: 10/05/24  2:50 PM   Result Value Ref Range    Osmolality 250 (L) 275 - 300 mOsm/kg   TSH with Reflex to FT4    Collection Time: 10/05/24  2:50 PM   Result Value Ref Range    TSH Reflex FT4 1.52 0.27 - 4.20 uIU/mL   Lactate Dehydrogenase    Collection Time: 10/05/24  2:50 PM   Result Value Ref Range     91 - 215 U/L   Haptoglobin    Collection Time: 10/05/24  2:50 PM   Result Value Ref Range    Haptoglobin 243.0 (H) 30.0 - 200.0 mg/dL   Ferritin    Collection Time: 10/05/24  2:50 PM   Result Value Ref Range    Ferritin 45.0 30.0 - 400.0 ng/mL   Reticulocytes    Collection Time: 10/05/24  2:50 PM   Result Value Ref Range    Retic Ct Pct 3.14 (H) 0.50 - 1.50 %    Retic Ct Abs 0.0911 0.0250 - 0.1250 M/uL    Hematocrit 27.3 (L) 42.0 - 52.0 %   Vitamin B12 & Folate    Collection Time: 10/05/24  2:50 PM   Result Value Ref Range    Vitamin B-12 910 232 - 1245 pg/mL    Folate >20.0 4.5 - 32.2 ng/mL   Iron and TIBC    Collection Time: 10/05/24  2:50 PM   Result Value Ref Range    Iron 36 (L) 59 - 158 ug/dL    TIBC 231 (L) 250 - 400 ug/dL    Iron % Saturation 16 14 - 50 %   TYPE AND SCREEN    Collection Time: 10/05/24  2:50 PM   Result Value Ref Range    ABO/Rh B POS     Antibody Screen NEG    POCT Glucose    Collection Time: 10/05/24  6:13 PM

## 2024-10-09 ENCOUNTER — TELEPHONE (OUTPATIENT)
Dept: UROLOGY | Age: 74
End: 2024-10-09

## 2024-10-09 DIAGNOSIS — N32.89 BLADDER SPASMS: Primary | ICD-10-CM

## 2024-10-09 RX ORDER — TROSPIUM CHLORIDE 20 MG/1
20 TABLET, FILM COATED ORAL 2 TIMES DAILY PRN
Qty: 10 TABLET | Refills: 0 | Status: SHIPPED | OUTPATIENT
Start: 2024-10-09

## 2024-10-09 NOTE — TELEPHONE ENCOUNTER
Patient's wife called in asking for refill of Trospium sent in for bladder spasms, she said they were bad last night. I mentioned utilizing the pyridium but she said it was the Trospium they were needing, please advise or send in if appropriate.

## 2024-10-09 NOTE — TELEPHONE ENCOUNTER
I have called and left a VM letting patient know we have sent in refill but he would need to stop 48 hours prior to office visit with Dr. Keita. I asked that he call our office back with any further questions.

## 2024-10-17 ENCOUNTER — OFFICE VISIT (OUTPATIENT)
Dept: UROLOGY | Age: 74
End: 2024-10-17
Payer: MEDICARE

## 2024-10-17 DIAGNOSIS — N40.1 BENIGN PROSTATIC HYPERPLASIA WITH URINARY RETENTION: Primary | ICD-10-CM

## 2024-10-17 DIAGNOSIS — R33.8 BENIGN PROSTATIC HYPERPLASIA WITH URINARY RETENTION: Primary | ICD-10-CM

## 2024-10-17 DIAGNOSIS — N31.2 ATONIC BLADDER: ICD-10-CM

## 2024-10-17 PROCEDURE — 1111F DSCHRG MED/CURRENT MED MERGE: CPT | Performed by: UROLOGY

## 2024-10-17 PROCEDURE — G8484 FLU IMMUNIZE NO ADMIN: HCPCS | Performed by: UROLOGY

## 2024-10-17 PROCEDURE — G8427 DOCREV CUR MEDS BY ELIG CLIN: HCPCS | Performed by: UROLOGY

## 2024-10-17 PROCEDURE — 99214 OFFICE O/P EST MOD 30 MIN: CPT | Performed by: UROLOGY

## 2024-10-17 PROCEDURE — 3017F COLORECTAL CA SCREEN DOC REV: CPT | Performed by: UROLOGY

## 2024-10-17 PROCEDURE — 1036F TOBACCO NON-USER: CPT | Performed by: UROLOGY

## 2024-10-17 PROCEDURE — G8417 CALC BMI ABV UP PARAM F/U: HCPCS | Performed by: UROLOGY

## 2024-10-17 PROCEDURE — 1123F ACP DISCUSS/DSCN MKR DOCD: CPT | Performed by: UROLOGY

## 2024-10-17 ASSESSMENT — ENCOUNTER SYMPTOMS
SHORTNESS OF BREATH: 0
EYE DISCHARGE: 0
VOMITING: 0
CONSTIPATION: 0
BACK PAIN: 0
DIARRHEA: 0
NAUSEA: 0
COUGH: 0
EYE REDNESS: 0
ABDOMINAL DISTENTION: 0
TROUBLE SWALLOWING: 0
ABDOMINAL PAIN: 0

## 2024-10-17 NOTE — PROGRESS NOTES
Zoey Alanis is a 74 y.o. male who presents today   Chief Complaint   Patient presents with    Post-Op Check     I am here today for my 3 week post op.      BPH with urinary retention  Patient with BPH and chronic urinary retention who is failed multiple voiding trials over the last 2 months on maximum alpha-blocker therapy with tamsulosin 0.8 mg.  He is a diabetic and has some decreased bladder sensation to fullness.  He underwent aqua ablation 438 g prostate and urinary retention on 2024.  He has failed 2 postoperative voiding trials last 1 being on 10/4/2024.  His course was complicated because he had hyponatremia from overconsumption of water requiring hospitalization this resolved.  His catheter was replaced at that time he comes in today for a fill and pull.  Per the MA note he was only able to void about 50 mL after placing him at 150 in the bladder he had some bladder spasm    Surgical pathology from his aqua ablation below    [address] [place] [address]   Department of Pathology   FINAL SURGICAL PATHOLOGY REPORT   Patient Name:  ZOEY ALANIS            Accession No:  [id number]    Age Sex:   1950    74 Y M        Pt Type: I     DIS 01                                              Location:   Account #      [id number]                 Collected:     2024   Med Rec #      [id number]                    Received:      2024   Attend Phys:   CARTER GRIFFIN             Completed:     2024   Perform Phys:  CARTER BRANDON     FINAL DIAGNOSIS:     Prostate, transurethral resection of prostate tissue: Benign prostatic   parenchyma with nodular glandular and stromal hyperplasia, consistent   with benign prostatic hypertrophy.    CBG/CBG     PREOP DIAGNOSIS:  ENLARGED PROSTATE WITH URINARY OBSTRUCTION     SPECIMEN(S):   PROSTATE, TRANS-URETHRAL RESECTION (TUR), PROSTATE TISSUE     GROSS DESCRIPTION:   Received in a

## 2024-10-17 NOTE — PROGRESS NOTES
Patient of Dr. Keita presents today for voiding trial post aquablation. The patient denies any fever, chills or  N&V. After patient had given consent, using the catheter in place, 120cc of sterile water was installed into the bladder with no complications. Patient was able to void 50cc during bladder spasms.     Dr. Keita was in office at time of procedure.     Patient was advised to drink clear fluids for the next couple hours and urinate. Patient was advised they may experience some blood in the urine and burning with urination for the next couple days. If the patient is unable to urinate or develops fever, chills, N&V or suprapubic pain, they will call office for an appt at clinic or seek medical treatment at nearest ER, PCP or Urgent Care if after hours. Patient verbalized understanding and all questions were answered.Patient advised to call the office with any questions or concerns.     Patient is to follow up as scheduled.

## 2024-11-04 ENCOUNTER — OFFICE VISIT (OUTPATIENT)
Age: 74
End: 2024-11-04
Payer: MEDICARE

## 2024-11-04 VITALS — HEIGHT: 66 IN | WEIGHT: 181 LBS | BODY MASS INDEX: 29.09 KG/M2

## 2024-11-04 DIAGNOSIS — R60.0 BILATERAL LOWER EXTREMITY EDEMA: Primary | ICD-10-CM

## 2024-11-04 DIAGNOSIS — M79.672 LEFT FOOT PAIN: Primary | ICD-10-CM

## 2024-11-04 DIAGNOSIS — I87.8 VENOUS STASIS OF BOTH LOWER EXTREMITIES: ICD-10-CM

## 2024-11-04 DIAGNOSIS — M79.671 RIGHT FOOT PAIN: ICD-10-CM

## 2024-11-04 PROCEDURE — G8427 DOCREV CUR MEDS BY ELIG CLIN: HCPCS | Performed by: PODIATRIST

## 2024-11-04 PROCEDURE — G8417 CALC BMI ABV UP PARAM F/U: HCPCS | Performed by: PODIATRIST

## 2024-11-04 PROCEDURE — 1159F MED LIST DOCD IN RCRD: CPT | Performed by: PODIATRIST

## 2024-11-04 PROCEDURE — G8484 FLU IMMUNIZE NO ADMIN: HCPCS | Performed by: PODIATRIST

## 2024-11-04 PROCEDURE — 1111F DSCHRG MED/CURRENT MED MERGE: CPT | Performed by: PODIATRIST

## 2024-11-04 PROCEDURE — 3017F COLORECTAL CA SCREEN DOC REV: CPT | Performed by: PODIATRIST

## 2024-11-04 PROCEDURE — 99203 OFFICE O/P NEW LOW 30 MIN: CPT | Performed by: PODIATRIST

## 2024-11-04 PROCEDURE — 1036F TOBACCO NON-USER: CPT | Performed by: PODIATRIST

## 2024-11-04 PROCEDURE — 1123F ACP DISCUSS/DSCN MKR DOCD: CPT | Performed by: PODIATRIST

## 2024-11-04 NOTE — PATIENT INSTRUCTIONS
Learning About Using Compression Stockings  Compression stockings help prevent blood and fluid from pooling in the legs. They may be used for problems like varicose veins, skin ulcers, and deep vein thrombosis (blood clot in the leg). There are different types of stockings, and they need to fit right.We typically recommend 15-20 mmhg compression and a below the knee stocking.    5 tips for putting on compression stockings    If your stockings are new, wash them in cold water.  This can make them easier to put on.     Put on your stockings early in the morning, if you can.  This is when you have the least swelling in your legs.     Wear them every day while you're awake, especially while you're on your feet.       Try putting silicone lotion or cornstarch on your legs.  This can make it easier to slide the stockings on.     To help your , try using rubber gloves.  Ask your doctor about other tools to help if it's hard to put on the stockings.   How to put on compression stockings  It can be a little tricky to put on compression stockings at first. But after you practice a few times, it may get easier. Here are the details.    Sit on a firm surface where your feet can touch the ground.   Hold the top of the stocking with one hand. Then with your other hand, reach in and grab the heel.     When you have a firm  on the heel, pull your hand back up through the stocking, turning it inside out as far as the heel.   Put your toes in as far as they will go. Then center your heel in the stocking and pull it up slightly, just around your heel.     Use both hands to grasp the folded part of the stocking about 2 inches below the fold. Pull that section up over your ankle.   Next, from above your ankle, grasp the folded part of the stocking about 2 inches below the fold. Pull that section up.     Continue pulling the stocking up in short sections until it is in its final position. The final position may be below your

## 2024-11-04 NOTE — PROGRESS NOTES
(See Comments)     Other Reaction(s): Blood in Urine    Jardiance [Empagliflozin] Other (See Comments)     Blood in urine        Ht 1.676 m (5' 6\")   Wt 82.1 kg (181 lb)   BMI 29.21 kg/m²      Physical Exam   Physical Examination:  General: The patient is a Well Nourished 74 y.o. male who is alert and oriented x3 in no distress. The patient is cooperative during the exam.  Psychological: Is a pleasant male, good mood and affect, answers questions appropriately.  Head and Neck: Normocephalic, Atraumatic. Neck supple, no evidence of masses.   Abdomen: Soft, nontender, nondistended.  Eyes: Perrla. Extraocular movements intact.   Respiratory: Rate and effort within normal limits.   Vascular: He does have bilateral lower extremity edema.  1/4 dorsalis pedis and 2/4 posterior tibial pulse bilaterally.  Neurological: Within normal limits bilaterally.  Dermatological: Skin is cool but dry and supple bilateral lower extremities.  Adequate and equal pedal hair growth present bilaterally.  Musculoskeletal: Weightbearing stance within normal limits.  Muscle strength 5-5 all groups bilaterally.  Gait Examination: Within normal limits            Plan    ICD-10-CM    1. Bilateral lower extremity edema  R60.0       2. Venous stasis of both lower extremities  I87.8            Plan Narrative  I did recommend compression stockings for him below the knee 15 to 20 mmHg.  Elevation in the evenings.  I did ask him to revisit with his primary care physician about a diuretic.  If he continues to have worsening symptoms would consider vascular referral for venous evaluation bilateral lower extremities.      Return if symptoms worsen or fail to improve.      Patient given educational materials - see patient instructions.   Discussed use, benefit, and side effects of prescribed medications.  All patient questions answered.  Pt voiced understanding. Patient agreed with treatment plan. Follow up as needed.    This dictation was generated by

## 2024-11-07 ENCOUNTER — OFFICE VISIT (OUTPATIENT)
Dept: UROLOGY | Age: 74
End: 2024-11-07

## 2024-11-07 VITALS — BODY MASS INDEX: 30.53 KG/M2 | HEIGHT: 66 IN | WEIGHT: 190 LBS | TEMPERATURE: 98.2 F

## 2024-11-07 DIAGNOSIS — R33.8 BENIGN PROSTATIC HYPERPLASIA WITH URINARY RETENTION: Primary | ICD-10-CM

## 2024-11-07 DIAGNOSIS — N40.1 BENIGN PROSTATIC HYPERPLASIA WITH URINARY RETENTION: Primary | ICD-10-CM

## 2024-11-07 LAB
BACTERIA URINE, POC: 0
BILIRUBIN URINE: 0 MG/DL
BLOOD, URINE: POSITIVE
CASTS URINE, POC: 0
CLARITY, UA: CLEAR
COLOR, UA: YELLOW
CRYSTALS URINE, POC: 0
EPI CELLS URINE, POC: 0
GLUCOSE URINE: ABNORMAL
KETONES, URINE: NEGATIVE
LEUKOCYTE EST, POC: ABNORMAL
NITRITE, URINE: NEGATIVE
PH UA: 5.5 (ref 4.5–8)
PROTEIN UA: POSITIVE
RBC URINE, POC: 4
SPECIFIC GRAVITY UA: 1.02 (ref 1–1.03)
UROBILINOGEN, URINE: ABNORMAL
WBC URINE, POC: 18
YEAST URINE, POC: 0

## 2024-11-07 ASSESSMENT — ENCOUNTER SYMPTOMS
ABDOMINAL DISTENTION: 0
VOMITING: 0
BACK PAIN: 0
NAUSEA: 0
ABDOMINAL PAIN: 0

## 2024-11-07 NOTE — PROGRESS NOTES
Zoey Alanis is a 74 y.o., male, Established patient who presents today   Chief Complaint   Patient presents with    Follow-up     I am here today for my 2 week follow up. I am not having to self cath.       BPH with urinary retention  Patient with BPH and chronic urinary retention who is failed multiple voiding trials over the last 2 months on maximum alpha-blocker therapy with tamsulosin 0.8 mg.  He is a diabetic and has some decreased bladder sensation to fullness.  He underwent aqua ablation  for a 138 g prostate and urinary retention on 9/24/2024.  He has failed 2 postoperative voiding trials last 1 being on 10/4/2024.  His course was complicated because he had hyponatremia from overconsumption of water requiring hospitalization this resolved.   His catheter was removed at his postop visit on 10/17/2024 and he was taught intermittent catheterization.  His wife has been doing the catheterizations for him.  He does come with bladder diary.  In reviewing this, he appears to void small amounts, anywhere from 50 to 200 cc, frequently sometimes going up once an hour.  However, sometimes there are several hours between voids.  During catheterization, and his wife reports that she was only getting out about 50 or 75 mL, so they discontinued this practice.  Postvoid residual today indicates he is emptying his bladder well.  Other than going frequently, he really has no large complaints.  Does have long history of type 2 diabetes so there is concern for component of atonic bladder.    REVIEW OF SYSTEMS:  Review of Systems   Constitutional:  Negative for chills and fever.   Gastrointestinal:  Negative for abdominal distention, abdominal pain, nausea and vomiting.   Genitourinary:  Positive for frequency. Negative for difficulty urinating, dysuria, flank pain, hematuria and urgency.   Musculoskeletal:  Negative for back pain and gait problem.   Psychiatric/Behavioral:  Negative for agitation and confusion.  
Impulse control disorder    Intellectual disability    Mood disorder

## 2024-12-05 ENCOUNTER — OFFICE VISIT (OUTPATIENT)
Dept: UROLOGY | Age: 74
End: 2024-12-05
Payer: MEDICARE

## 2024-12-05 VITALS — HEIGHT: 66 IN | BODY MASS INDEX: 31.5 KG/M2 | WEIGHT: 196 LBS | TEMPERATURE: 97.5 F

## 2024-12-05 DIAGNOSIS — N40.1 BENIGN PROSTATIC HYPERPLASIA WITH URINARY RETENTION: Primary | ICD-10-CM

## 2024-12-05 DIAGNOSIS — R33.8 BENIGN PROSTATIC HYPERPLASIA WITH URINARY RETENTION: Primary | ICD-10-CM

## 2024-12-05 DIAGNOSIS — N31.2 ATONIC BLADDER: ICD-10-CM

## 2024-12-05 LAB
BACTERIA URINE, POC: ABNORMAL
BILIRUBIN URINE: 0 MG/DL
BLOOD, URINE: POSITIVE
CASTS URINE, POC: ABNORMAL
COLOR, UA: YELLOW
CRYSTALS URINE, POC: ABNORMAL
GLUCOSE URINE: ABNORMAL
KETONES, URINE: NEGATIVE
LEUKOCYTE EST, POC: ABNORMAL
NITRITE, URINE: NEGATIVE
PH UA: 5.5 (ref 4.5–8)
PROTEIN UA: POSITIVE
RBC URINE, POC: ABNORMAL
UROBILINOGEN, URINE: ABNORMAL
WBC URINE, POC: 14
YEAST URINE, POC: ABNORMAL

## 2024-12-05 PROCEDURE — 81001 URINALYSIS AUTO W/SCOPE: CPT | Performed by: NURSE PRACTITIONER

## 2024-12-05 PROCEDURE — 3017F COLORECTAL CA SCREEN DOC REV: CPT | Performed by: NURSE PRACTITIONER

## 2024-12-05 PROCEDURE — 51798 US URINE CAPACITY MEASURE: CPT | Performed by: NURSE PRACTITIONER

## 2024-12-05 PROCEDURE — 1123F ACP DISCUSS/DSCN MKR DOCD: CPT | Performed by: NURSE PRACTITIONER

## 2024-12-05 PROCEDURE — 1036F TOBACCO NON-USER: CPT | Performed by: NURSE PRACTITIONER

## 2024-12-05 PROCEDURE — 1160F RVW MEDS BY RX/DR IN RCRD: CPT | Performed by: NURSE PRACTITIONER

## 2024-12-05 PROCEDURE — 1159F MED LIST DOCD IN RCRD: CPT | Performed by: NURSE PRACTITIONER

## 2024-12-05 PROCEDURE — G8484 FLU IMMUNIZE NO ADMIN: HCPCS | Performed by: NURSE PRACTITIONER

## 2024-12-05 PROCEDURE — 99214 OFFICE O/P EST MOD 30 MIN: CPT | Performed by: NURSE PRACTITIONER

## 2024-12-05 PROCEDURE — G8417 CALC BMI ABV UP PARAM F/U: HCPCS | Performed by: NURSE PRACTITIONER

## 2024-12-05 PROCEDURE — G8427 DOCREV CUR MEDS BY ELIG CLIN: HCPCS | Performed by: NURSE PRACTITIONER

## 2024-12-05 RX ORDER — ROSUVASTATIN CALCIUM 10 MG/1
10 TABLET, COATED ORAL
COMMUNITY
Start: 2024-11-02 | End: 2025-01-31

## 2024-12-05 RX ORDER — TAMSULOSIN HYDROCHLORIDE 0.4 MG/1
0.4 CAPSULE ORAL NIGHTLY
Qty: 90 CAPSULE | Refills: 3 | Status: SHIPPED | OUTPATIENT
Start: 2024-12-05

## 2024-12-05 ASSESSMENT — ENCOUNTER SYMPTOMS
BACK PAIN: 0
VOMITING: 0
ABDOMINAL PAIN: 0
NAUSEA: 0
ABDOMINAL DISTENTION: 0

## 2024-12-05 NOTE — PROGRESS NOTES
Zoey Alanis is a 74 y.o., male, Established patient who presents today   Chief Complaint   Patient presents with    Follow-up     I am here today for my 4-6 week follow up.       BPH with urinary retention  Patient with BPH and chronic urinary retention who is failed multiple voiding trials on maximum alpha-blocker therapy with tamsulosin 0.8 mg.  He is a diabetic and has some decreased bladder sensation to fullness.  He underwent aqua ablation  for a 138 g prostate and urinary retention on 9/24/2024.  He has failed 2 postoperative voiding trials last 1 being on 10/4/2024.  His course was complicated because he had hyponatremia from overconsumption of water requiring hospitalization this resolved.   His catheter was removed at his postop visit on 10/17/2024 and he was taught intermittent catheterization.  His wife had been doing the catheterizations for him, but review of his bladder diary revealed residuals consistently below 200 mL.  He is now only utilizing 0.4 mg of Flomax patient continues to have some frequency, otherwise really no significant complaints. Does have long history of type 2 diabetes so there is concern for component of atonic bladder.    REVIEW OF SYSTEMS:  Review of Systems   Constitutional:  Negative for chills and fever.   Gastrointestinal:  Negative for abdominal distention, abdominal pain, nausea and vomiting.   Genitourinary:  Positive for frequency. Negative for difficulty urinating, dysuria, flank pain, hematuria and urgency.   Musculoskeletal:  Negative for back pain and gait problem.   Psychiatric/Behavioral:  Negative for agitation and confusion.        PHYSICAL EXAM:  Temp 97.5 °F (36.4 °C) (Temporal)   Ht 1.676 m (5' 6\")   Wt 88.9 kg (196 lb)   BMI 31.64 kg/m²   Physical Exam  Vitals and nursing note reviewed.   Constitutional:       General: He is not in acute distress.     Appearance: Normal appearance. He is not ill-appearing.   Pulmonary:      Effort: Pulmonary effort is

## 2024-12-23 RX ORDER — ASPIRIN 81 MG/1
TABLET, COATED ORAL
Qty: 90 TABLET | Refills: 1 | OUTPATIENT
Start: 2024-12-23

## 2025-01-19 ENCOUNTER — APPOINTMENT (OUTPATIENT)
Dept: GENERAL RADIOLOGY | Facility: HOSPITAL | Age: 75
End: 2025-01-19
Payer: MEDICARE

## 2025-01-19 ENCOUNTER — APPOINTMENT (OUTPATIENT)
Dept: CARDIOLOGY | Facility: HOSPITAL | Age: 75
End: 2025-01-19
Payer: MEDICARE

## 2025-01-19 ENCOUNTER — HOSPITAL ENCOUNTER (EMERGENCY)
Facility: HOSPITAL | Age: 75
Discharge: HOME | End: 2025-01-19
Attending: EMERGENCY MEDICINE | Admitting: EMERGENCY MEDICINE
Payer: MEDICARE

## 2025-01-19 VITALS
DIASTOLIC BLOOD PRESSURE: 65 MMHG | WEIGHT: 205.5 LBS | HEIGHT: 67 IN | OXYGEN SATURATION: 95 % | HEART RATE: 62 BPM | RESPIRATION RATE: 18 BRPM | TEMPERATURE: 97.7 F | SYSTOLIC BLOOD PRESSURE: 174 MMHG | BODY MASS INDEX: 32.25 KG/M2

## 2025-01-19 DIAGNOSIS — R07.9 CHEST PAIN, UNSPECIFIED TYPE: Primary | ICD-10-CM

## 2025-01-19 DIAGNOSIS — R03.0 ELEVATED BLOOD PRESSURE READING: ICD-10-CM

## 2025-01-19 LAB
ALBUMIN SERPL-MCNC: 3.9 G/DL (ref 3.5–5.2)
ALBUMIN/GLOB SERPL: 1.5 G/DL
ALP SERPL-CCNC: 78 U/L (ref 39–117)
ALT SERPL W P-5'-P-CCNC: 14 U/L (ref 1–41)
ANION GAP SERPL CALCULATED.3IONS-SCNC: 9 MMOL/L (ref 5–15)
AST SERPL-CCNC: 16 U/L (ref 1–40)
BASOPHILS # BLD AUTO: 0.02 10*3/MM3 (ref 0–0.2)
BASOPHILS NFR BLD AUTO: 0.4 % (ref 0–1.5)
BH CV STRESS BP STAGE 1: NORMAL
BH CV STRESS BP STAGE 2: NORMAL
BH CV STRESS DOSE DOBUTAMINE STAGE 1: 10
BH CV STRESS DOSE DOBUTAMINE STAGE 2: 20
BH CV STRESS DURATION MIN STAGE 1: 3
BH CV STRESS DURATION MIN STAGE 2: 3
BH CV STRESS DURATION SEC STAGE 1: 0
BH CV STRESS DURATION SEC STAGE 2: 0
BH CV STRESS HR STAGE 1: 91
BH CV STRESS HR STAGE 2: 130
BH CV STRESS PROTOCOL 1: NORMAL
BH CV STRESS RECOVERY BP: NORMAL MMHG
BH CV STRESS RECOVERY HR: 81 BPM
BH CV STRESS STAGE 1: 1
BH CV STRESS STAGE 2: 2
BILIRUB SERPL-MCNC: 0.3 MG/DL (ref 0–1.2)
BUN SERPL-MCNC: 18 MG/DL (ref 8–23)
BUN/CREAT SERPL: 21.2 (ref 7–25)
CALCIUM SPEC-SCNC: 8.9 MG/DL (ref 8.6–10.5)
CHLORIDE SERPL-SCNC: 109 MMOL/L (ref 98–107)
CO2 SERPL-SCNC: 24 MMOL/L (ref 22–29)
CREAT SERPL-MCNC: 0.85 MG/DL (ref 0.76–1.27)
DEPRECATED RDW RBC AUTO: 47.4 FL (ref 37–54)
EGFRCR SERPLBLD CKD-EPI 2021: 90.6 ML/MIN/1.73
EOSINOPHIL # BLD AUTO: 0.2 10*3/MM3 (ref 0–0.4)
EOSINOPHIL NFR BLD AUTO: 4.1 % (ref 0.3–6.2)
ERYTHROCYTE [DISTWIDTH] IN BLOOD BY AUTOMATED COUNT: 15.5 % (ref 12.3–15.4)
FLUAV RNA RESP QL NAA+PROBE: NOT DETECTED
FLUBV RNA RESP QL NAA+PROBE: NOT DETECTED
GEN 5 1HR TROPONIN T REFLEX: 17 NG/L
GLOBULIN UR ELPH-MCNC: 2.6 GM/DL
GLUCOSE BLDC GLUCOMTR-MCNC: 131 MG/DL (ref 70–130)
GLUCOSE BLDC GLUCOMTR-MCNC: 53 MG/DL (ref 70–130)
GLUCOSE SERPL-MCNC: 87 MG/DL (ref 65–99)
HCT VFR BLD AUTO: 31.3 % (ref 37.5–51)
HGB BLD-MCNC: 9.6 G/DL (ref 13–17.7)
HOLD SPECIMEN: NORMAL
IMM GRANULOCYTES # BLD AUTO: 0.02 10*3/MM3 (ref 0–0.05)
IMM GRANULOCYTES NFR BLD AUTO: 0.4 % (ref 0–0.5)
LIPASE SERPL-CCNC: 37 U/L (ref 13–60)
LYMPHOCYTES # BLD AUTO: 0.71 10*3/MM3 (ref 0.7–3.1)
LYMPHOCYTES NFR BLD AUTO: 14.6 % (ref 19.6–45.3)
MAXIMAL PREDICTED HEART RATE: 145 BPM
MCH RBC QN AUTO: 25.9 PG (ref 26.6–33)
MCHC RBC AUTO-ENTMCNC: 30.7 G/DL (ref 31.5–35.7)
MCV RBC AUTO: 84.4 FL (ref 79–97)
MONOCYTES # BLD AUTO: 0.47 10*3/MM3 (ref 0.1–0.9)
MONOCYTES NFR BLD AUTO: 9.7 % (ref 5–12)
NEUTROPHILS NFR BLD AUTO: 3.44 10*3/MM3 (ref 1.7–7)
NEUTROPHILS NFR BLD AUTO: 70.8 % (ref 42.7–76)
NRBC BLD AUTO-RTO: 0 /100 WBC (ref 0–0.2)
NT-PROBNP SERPL-MCNC: 453.9 PG/ML (ref 0–1800)
PERCENT MAX PREDICTED HR: 89.66 %
PLATELET # BLD AUTO: 180 10*3/MM3 (ref 140–450)
PMV BLD AUTO: 10.4 FL (ref 6–12)
POTASSIUM SERPL-SCNC: 3.8 MMOL/L (ref 3.5–5.2)
PROT SERPL-MCNC: 6.5 G/DL (ref 6–8.5)
RBC # BLD AUTO: 3.71 10*6/MM3 (ref 4.14–5.8)
SARS-COV-2 RNA RESP QL NAA+PROBE: NOT DETECTED
SODIUM SERPL-SCNC: 142 MMOL/L (ref 136–145)
STRESS BASELINE BP: NORMAL MMHG
STRESS BASELINE HR: 67 BPM
STRESS PERCENT HR: 105 %
STRESS POST PEAK BP: NORMAL MMHG
STRESS POST PEAK HR: 130 BPM
STRESS TARGET HR: 123 BPM
TROPONIN T NUMERIC DELTA: 2 NG/L
TROPONIN T SERPL HS-MCNC: 12 NG/L
TROPONIN T SERPL HS-MCNC: 15 NG/L
WBC NRBC COR # BLD AUTO: 4.86 10*3/MM3 (ref 3.4–10.8)
WHOLE BLOOD HOLD COAG: NORMAL
WHOLE BLOOD HOLD SPECIMEN: NORMAL

## 2025-01-19 PROCEDURE — 82948 REAGENT STRIP/BLOOD GLUCOSE: CPT

## 2025-01-19 PROCEDURE — 83880 ASSAY OF NATRIURETIC PEPTIDE: CPT | Performed by: EMERGENCY MEDICINE

## 2025-01-19 PROCEDURE — 84484 ASSAY OF TROPONIN QUANT: CPT | Performed by: EMERGENCY MEDICINE

## 2025-01-19 PROCEDURE — 25010000002 DOBUTAMINE PER 250 MG: Performed by: EMERGENCY MEDICINE

## 2025-01-19 PROCEDURE — 87636 SARSCOV2 & INF A&B AMP PRB: CPT | Performed by: EMERGENCY MEDICINE

## 2025-01-19 PROCEDURE — 25010000002 LABETALOL 5 MG/ML SOLUTION: Performed by: EMERGENCY MEDICINE

## 2025-01-19 PROCEDURE — 99285 EMERGENCY DEPT VISIT HI MDM: CPT

## 2025-01-19 PROCEDURE — 93016 CV STRESS TEST SUPVJ ONLY: CPT | Performed by: INTERNAL MEDICINE

## 2025-01-19 PROCEDURE — 93005 ELECTROCARDIOGRAM TRACING: CPT | Performed by: EMERGENCY MEDICINE

## 2025-01-19 PROCEDURE — 93018 CV STRESS TEST I&R ONLY: CPT | Performed by: INTERNAL MEDICINE

## 2025-01-19 PROCEDURE — 80053 COMPREHEN METABOLIC PANEL: CPT | Performed by: EMERGENCY MEDICINE

## 2025-01-19 PROCEDURE — 93352 ADMIN ECG CONTRAST AGENT: CPT | Performed by: INTERNAL MEDICINE

## 2025-01-19 PROCEDURE — 36415 COLL VENOUS BLD VENIPUNCTURE: CPT

## 2025-01-19 PROCEDURE — 83690 ASSAY OF LIPASE: CPT | Performed by: EMERGENCY MEDICINE

## 2025-01-19 PROCEDURE — 93350 STRESS TTE ONLY: CPT

## 2025-01-19 PROCEDURE — 93017 CV STRESS TEST TRACING ONLY: CPT

## 2025-01-19 PROCEDURE — 96374 THER/PROPH/DIAG INJ IV PUSH: CPT

## 2025-01-19 PROCEDURE — 25510000001 PERFLUTREN 6.52 MG/ML SUSPENSION: Performed by: EMERGENCY MEDICINE

## 2025-01-19 PROCEDURE — 71046 X-RAY EXAM CHEST 2 VIEWS: CPT

## 2025-01-19 PROCEDURE — 93005 ELECTROCARDIOGRAM TRACING: CPT

## 2025-01-19 PROCEDURE — 93350 STRESS TTE ONLY: CPT | Performed by: INTERNAL MEDICINE

## 2025-01-19 PROCEDURE — 85025 COMPLETE CBC W/AUTO DIFF WBC: CPT | Performed by: EMERGENCY MEDICINE

## 2025-01-19 RX ORDER — NITROGLYCERIN 0.4 MG/1
0.4 TABLET SUBLINGUAL
Status: DISCONTINUED | OUTPATIENT
Start: 2025-01-19 | End: 2025-01-19

## 2025-01-19 RX ORDER — NITROGLYCERIN 0.4 MG/1
0.4 TABLET SUBLINGUAL
Status: DISCONTINUED | OUTPATIENT
Start: 2025-01-19 | End: 2025-01-19 | Stop reason: HOSPADM

## 2025-01-19 RX ORDER — SODIUM CHLORIDE 0.9 % (FLUSH) 0.9 %
10 SYRINGE (ML) INJECTION AS NEEDED
Status: DISCONTINUED | OUTPATIENT
Start: 2025-01-19 | End: 2025-01-19 | Stop reason: HOSPADM

## 2025-01-19 RX ORDER — DOBUTAMINE HYDROCHLORIDE 100 MG/100ML
10-50 INJECTION INTRAVENOUS
Status: DISCONTINUED | OUTPATIENT
Start: 2025-01-19 | End: 2025-01-19

## 2025-01-19 RX ORDER — LABETALOL HYDROCHLORIDE 5 MG/ML
20 INJECTION, SOLUTION INTRAVENOUS ONCE
Status: COMPLETED | OUTPATIENT
Start: 2025-01-19 | End: 2025-01-19

## 2025-01-19 RX ORDER — ASPIRIN 81 MG/1
324 TABLET, CHEWABLE ORAL ONCE
Status: COMPLETED | OUTPATIENT
Start: 2025-01-19 | End: 2025-01-19

## 2025-01-19 RX ORDER — METOPROLOL TARTRATE 25 MG/1
37.5 TABLET, FILM COATED ORAL 2 TIMES DAILY
Qty: 90 TABLET | Refills: 0 | Status: SHIPPED | OUTPATIENT
Start: 2025-01-19 | End: 2025-02-18

## 2025-01-19 RX ADMIN — ASPIRIN 324 MG: 81 TABLET, CHEWABLE ORAL at 07:15

## 2025-01-19 RX ADMIN — PERFLUTREN 8.48 MG: 6.52 INJECTION, SUSPENSION INTRAVENOUS at 14:34

## 2025-01-19 RX ADMIN — DOBUTAMINE HYDROCHLORIDE 10 MCG/KG/MIN: 100 INJECTION INTRAVENOUS at 14:34

## 2025-01-19 RX ADMIN — LABETALOL HYDROCHLORIDE 20 MG: 5 INJECTION INTRAVENOUS at 15:29

## 2025-01-19 RX ADMIN — NITROGLYCERIN 0.4 MG: 0.4 TABLET SUBLINGUAL at 11:06

## 2025-01-19 NOTE — ED PROVIDER NOTES
Subjective   History of Present Illness    Review of Systems    Past Medical History:   Diagnosis Date    CAD in native artery     Cardiac dysrhythmia     Controlled diabetes mellitus type II without complication     Fatigue     Hyperlipidemia, mixed     Hypertension     Benign    S/P CABG x 3        Allergies   Allergen Reactions    Dapagliflozin Other (See Comments)    Latex Hives     blisters         Past Surgical History:   Procedure Laterality Date    CARDIAC CATHETERIZATION  2010    Left Heart    CORONARY ARTERY BYPASS GRAFT      x 3       Family History   Problem Relation Age of Onset    Coronary artery disease Father     Heart attack Father     Heart failure Father     No Known Problems Mother        Social History     Socioeconomic History    Marital status:    Tobacco Use    Smoking status: Former     Current packs/day: 0.00     Types: Cigarettes     Quit date:      Years since quittin.0    Smokeless tobacco: Never   Vaping Use    Vaping status: Never Used   Substance and Sexual Activity    Alcohol use: No    Drug use: No    Sexual activity: Defer           Objective   Physical Exam    Procedures           ED Course  ED Course as of 25 1717   Sun 2025   1018 Discussed with the hospitalist recommendation to get a stress test stress test has been ordered. [TS]   1018 I had extensive discussion with the patient and family regarding further testing as an outpatient versus inpatient.  Initially the patient wanted to go home then they decided to go ahead and get a stress test while they are in the ER. [TS]   1020 Was going to admit the patient to the hospitalist service they wanted the patient to get a outpatient stress test and still getting admitted [TS]   1100 Patient started complaining of chest pain was given repeat EKG which showed normal sinus rhythm no evidence of acute STEMI will give sublingual nitroglycerin. [TS]   1115 Discussed with Dr. Olmstead will get a repeat  Trope and if that is negative sending for a stress test. [TS]   1712 Patient was having some chest pain and chest discomfort.  Had cardiac markers are negative.  BNP is negative.  Well score is 0.  I have discussed this case Vi with the patient and with cardiology eventually a stress test obtained on the patient [TS]      ED Course User Index  [TS] Ronak Adan MD                  HEART Score: 6   Shared Decision Making  I discussed the findings with the patient/patient representative who is in agreement with the treatment plan and the final disposition.  Risks and benefits of discharge and/or observation/admission were discussed: Yes                                      Medical Decision Making  These refer to Dr. Pagan probably due to the patient seen by him for chest pain.  His heart score is 6 well score 0 stress test obtained which is negative for acute ischemia.  I have discussed this case and the patient will discharge him home and have him follow the primary MD in the ED he had a couple episodes of nonsustained VT which could be artifact.  But could also be secondary to dobutamine I have discussed this Dr. Olmstead recommendation was to increase the dose of Lopressor to 37.5 mg twice a day and have him follow-up with Dr. Rosen.    Problems Addressed:  Chest pain, unspecified type: complicated acute illness or injury  Elevated blood pressure reading: complicated acute illness or injury    Amount and/or Complexity of Data Reviewed  Labs: ordered.     Details: As reviewed  Radiology: ordered.     Details: X-rays reviewed  ECG/medicine tests: ordered.    Risk  OTC drugs.  Prescription drug management.  Risk Details: Well score 0    This patient presents with chest pain , patient arrived hemodynamically stable was placed on the monitor and IV access obtained EKG was obtained and did not reveal any malignant/unstable dysrhythmias any acute ST elevation, no evidence of Brugada, or significantly prolonged  QT .  Presentation not consistent with other acute, emergent cause of chest pain at this time.  Low suspicion for acute PE is low risk per Wells andno evidence of DVT such as calf swelling, tenderness, palpable tortuous lower extremity vein, or Homans' sign.  Low suspicion for pneumothorax as the patient is saturating well and has no radiographic evidence of a pneumothorax.  Low suspicion for dissection there is no widened mediastinum, hypotension, pulses deficit, and no tearing back/abdominal pain.  Low suspicion for tamponade as there is no JVD, muffled heart sounds, electrical alternans on EKG, and no hypotension.  Low suspicion for pericarditis as there is no diffuse ST elevation or CA depression and the patient is afebrile.  No evidence of GI bleed per patient's history.  Low suspicion for CHF exacerbation as there is no evidence of volume overload and no evidence of severely elevated BNP.  Low suspicion for pneumonia since the patient has no fever no productive cough no chest x-ray findings of pneumonia and no leukocytosis.         Final diagnoses:   Chest pain, unspecified type   Elevated blood pressure reading       ED Disposition  ED Disposition       ED Disposition   Discharge    Condition   Stable    Comment   --               Agustin Munoz MD  [address] [facility] [address] [phone]          Wagner Bennett MD  [address] [id number] [phone]    Schedule an appointment as soon as possible for a visit            Medication List        Changed      metoprolol tartrate 25 MG tablet  Commonly known as: LOPRESSOR  Take 1.5 tablets by mouth 2 (Two) Times a Day for 30 days.  What changed: how much to take               Where to Get Your Medications        These medications were sent to [facility]/pharmacy #[id number] - [place], [place] - [facility] - [phone]  - [phone] [address] [address]      Phone: [phone]   metoprolol  tartrate 25 MG tablet            Ronak Adan MD  01/19/25 0460

## 2025-01-19 NOTE — ED PROVIDER NOTES
Subjective   History of Present Illness  Pt presents to the  with report of central chest pain that began around 0400 today - lasted around 20min.  Pt took a NTG tablet at home - states pain was relieved 15min or so after taking this.  Has had some SOB for the past 3wks - worse with exertion.  No n/v/f/c.  + bilateral LE swelling.  Pt has hx of prior CABG- follows with Dr. Bennett' clinic.        Review of Systems   Constitutional:  Negative for chills and fever.   HENT:  Negative for congestion.    Respiratory:  Positive for shortness of breath.    Cardiovascular:  Positive for chest pain and leg swelling.   Gastrointestinal:  Negative for abdominal pain, nausea and vomiting.   Genitourinary:  Negative for dysuria.   Musculoskeletal:  Negative for back pain.   All other systems reviewed and are negative.      Past Medical History:   Diagnosis Date    CAD in native artery     Cardiac dysrhythmia     Controlled diabetes mellitus type II without complication     Fatigue     Hyperlipidemia, mixed     Hypertension     Benign    S/P CABG x 3        Allergies   Allergen Reactions    Dapagliflozin Other (See Comments)    Latex Hives     blisters         Past Surgical History:   Procedure Laterality Date    CARDIAC CATHETERIZATION  2010    Left Heart    CORONARY ARTERY BYPASS GRAFT      x 3       Family History   Problem Relation Age of Onset    Coronary artery disease Father     Heart attack Father     Heart failure Father     No Known Problems Mother        Social History     Socioeconomic History    Marital status:    Tobacco Use    Smoking status: Former     Current packs/day: 0.00     Types: Cigarettes     Quit date:      Years since quittin.0    Smokeless tobacco: Never   Vaping Use    Vaping status: Never Used   Substance and Sexual Activity    Alcohol use: No    Drug use: No    Sexual activity: Defer           Objective   Physical Exam  Vitals and nursing note reviewed.   Constitutional:        General: He is not in acute distress.  HENT:      Head: Normocephalic and atraumatic.   Cardiovascular:      Rate and Rhythm: Normal rate and regular rhythm.      Heart sounds: Normal heart sounds.   Pulmonary:      Effort: Pulmonary effort is normal.      Breath sounds: Normal breath sounds. No decreased breath sounds or rhonchi.   Abdominal:      General: Bowel sounds are normal.      Palpations: Abdomen is soft.   Musculoskeletal:      Right lower leg: Edema present.      Left lower leg: Edema present.   Skin:     General: Skin is warm and dry.      Capillary Refill: Capillary refill takes less than 2 seconds.   Neurological:      Mental Status: He is alert.         Procedures           ED Course  ED Course as of 01/19/25 1840   Sun Jan 19, 2025   1018 Discussed with the hospitalist recommendation to get a stress test stress test has been ordered. [TS]   1018 I had extensive discussion with the patient and family regarding further testing as an outpatient versus inpatient.  Initially the patient wanted to go home then they decided to go ahead and get a stress test while they are in the ER. [TS]   1020 Was going to admit the patient to the hospitalist service they wanted the patient to get a outpatient stress test and still getting admitted [TS]   1100 Patient started complaining of chest pain was given repeat EKG which showed normal sinus rhythm no evidence of acute STEMI will give sublingual nitroglycerin. [TS]   1115 Discussed with Dr. Olmstead will get a repeat Trope and if that is negative sending for a stress test. [TS]   1712 Patient was having some chest pain and chest discomfort.  Had cardiac markers are negative.  BNP is negative.  Well score is 0.  I have discussed this case Vi with the patient and with cardiology eventually a stress test obtained on the patient [TS]      ED Course User Index  [TS] Ronak Adan MD                                                       Medical Decision Making  Amount  and/or Complexity of Data Reviewed  Labs: ordered.  Radiology: ordered.  ECG/medicine tests: ordered and independent interpretation performed.     Details: NSR, Nml axis, No acute STT changes    Risk  OTC drugs.  Prescription drug management.        Final diagnoses:   Chest pain, unspecified type       ED Disposition  ED Disposition       None            No follow-up provider specified.       Medication List      No changes were made to your prescriptions during this visit.            Gavino Pagan,   01/19/25 0632       Gavino Pagan,   01/19/25 2356

## 2025-01-19 NOTE — DISCHARGE INSTRUCTIONS
It was very nice to meet you, Sheeba. Thank you for allowing us to take care of you today at [facility].     Today you were seen in the emergency department for your symptoms. Please understand that an ER evaluation is just the start of your evaluation. We do the best we can, but we are often unable to fully find what is causing your symptoms from one evaluation.  Because of this, the goal is to determine whether you need to be evaluated in the hospital or if it is safe for you to go home and see other doctors provided such as primary care physicians or specialist on an outpatient basis.      Like we discussed, I strongly urge that you follow up with your primary care doctor. Please call their office to set up an appointment as soon as possible so that you can be re-evaluated for improvement in your symptoms or for any other questions.  I have provided the information needed, including phone number, to call to set up an appointment below in these discharge papers.      Educational material has also been provided in the following pages regarding what we have discussed today.      Please return to the emergency room within 12-48 hours if you experience symptoms such as the following:   Fever, chills, chest pain or shortness of breath, pain with inspiration/expiration, pain that travels to your arms, neck or back, nausea, vomiting, severe headache, tearing pain in your chest, dizziness, feel as though you are about to pass out, OR if you have any worsening symptoms, or any other concerns.

## 2025-01-20 ENCOUNTER — TELEPHONE (OUTPATIENT)
Dept: CARDIOLOGY | Facility: CLINIC | Age: 75
End: 2025-01-20
Payer: MEDICARE

## 2025-01-20 NOTE — TELEPHONE ENCOUNTER
I contacted the patient's wife and made her aware.  She voiced understanding.  She stated they have an appointment this afternoon with his PCP.

## 2025-01-20 NOTE — TELEPHONE ENCOUNTER
Caller: Chip,April    Relationship to patient: Emergency Contact    Best call back number: [phone]    Chief complaint: CHEST PAIN ( NOT AT TIME OF CALL )    Type of visit: ED FOLLOW UP     Requested date: AS ADVISED          Additional notes:DISCHARGE INSTRUCTIONS TO FOLLOW UP WITH  BUT DO TIME FRAME GIVEN.

## 2025-01-21 LAB
QT INTERVAL: 408 MS
QT INTERVAL: 446 MS
QTC INTERVAL: 481 MS
QTC INTERVAL: 496 MS

## 2025-02-10 ENCOUNTER — OFFICE VISIT (OUTPATIENT)
Dept: GASTROENTEROLOGY | Age: 75
End: 2025-02-10
Payer: MEDICARE

## 2025-02-10 VITALS
HEART RATE: 50 BPM | HEIGHT: 66 IN | SYSTOLIC BLOOD PRESSURE: 130 MMHG | BODY MASS INDEX: 31.82 KG/M2 | DIASTOLIC BLOOD PRESSURE: 65 MMHG | WEIGHT: 198 LBS | OXYGEN SATURATION: 100 %

## 2025-02-10 DIAGNOSIS — D50.9 IRON DEFICIENCY ANEMIA, UNSPECIFIED IRON DEFICIENCY ANEMIA TYPE: ICD-10-CM

## 2025-02-10 DIAGNOSIS — K21.9 GASTROESOPHAGEAL REFLUX DISEASE, UNSPECIFIED WHETHER ESOPHAGITIS PRESENT: ICD-10-CM

## 2025-02-10 DIAGNOSIS — D50.9 IRON DEFICIENCY ANEMIA, UNSPECIFIED IRON DEFICIENCY ANEMIA TYPE: Primary | ICD-10-CM

## 2025-02-10 DIAGNOSIS — R68.81 EARLY SATIETY: ICD-10-CM

## 2025-02-10 LAB
ALBUMIN SERPL-MCNC: 4.3 G/DL (ref 3.5–5.2)
ALP SERPL-CCNC: 78 U/L (ref 40–129)
ALT SERPL-CCNC: 22 U/L (ref 5–41)
ANION GAP SERPL CALCULATED.3IONS-SCNC: 13 MMOL/L (ref 8–16)
AST SERPL-CCNC: 25 U/L (ref 5–40)
BASOPHILS # BLD: 0 K/UL (ref 0–0.2)
BASOPHILS NFR BLD: 0.4 % (ref 0–1)
BILIRUB SERPL-MCNC: 0.4 MG/DL (ref 0.2–1.2)
BUN SERPL-MCNC: 20 MG/DL (ref 8–23)
CALCIUM SERPL-MCNC: 9.4 MG/DL (ref 8.8–10.2)
CHLORIDE SERPL-SCNC: 106 MMOL/L (ref 98–107)
CO2 SERPL-SCNC: 24 MMOL/L (ref 22–29)
CREAT SERPL-MCNC: 1 MG/DL (ref 0.7–1.2)
EOSINOPHIL # BLD: 0.2 K/UL (ref 0–0.6)
EOSINOPHIL NFR BLD: 3.1 % (ref 0–5)
ERYTHROCYTE [DISTWIDTH] IN BLOOD BY AUTOMATED COUNT: 17.3 % (ref 11.5–14.5)
GLUCOSE SERPL-MCNC: 80 MG/DL (ref 70–99)
HCT VFR BLD AUTO: 34.9 % (ref 42–52)
HGB BLD-MCNC: 10.5 G/DL (ref 14–18)
IMM GRANULOCYTES # BLD: 0 K/UL
LYMPHOCYTES # BLD: 1.2 K/UL (ref 1.1–4.5)
LYMPHOCYTES NFR BLD: 18.1 % (ref 20–40)
MCH RBC QN AUTO: 26.5 PG (ref 27–31)
MCHC RBC AUTO-ENTMCNC: 30.1 G/DL (ref 33–37)
MCV RBC AUTO: 88.1 FL (ref 80–94)
MONOCYTES # BLD: 0.6 K/UL (ref 0–0.9)
MONOCYTES NFR BLD: 8.4 % (ref 0–10)
NEUTROPHILS # BLD: 4.7 K/UL (ref 1.5–7.5)
NEUTS SEG NFR BLD: 69.9 % (ref 50–65)
PLATELET # BLD AUTO: 212 K/UL (ref 130–400)
PMV BLD AUTO: 10.9 FL (ref 9.4–12.4)
POTASSIUM SERPL-SCNC: 4.6 MMOL/L (ref 3.5–5.1)
PROT SERPL-MCNC: 7.1 G/DL (ref 6.4–8.3)
RBC # BLD AUTO: 3.96 M/UL (ref 4.7–6.1)
SODIUM SERPL-SCNC: 143 MMOL/L (ref 136–145)
WBC # BLD AUTO: 6.7 K/UL (ref 4.8–10.8)

## 2025-02-10 PROCEDURE — 3075F SYST BP GE 130 - 139MM HG: CPT

## 2025-02-10 PROCEDURE — 99214 OFFICE O/P EST MOD 30 MIN: CPT

## 2025-02-10 PROCEDURE — 1159F MED LIST DOCD IN RCRD: CPT

## 2025-02-10 PROCEDURE — 1123F ACP DISCUSS/DSCN MKR DOCD: CPT

## 2025-02-10 PROCEDURE — G8417 CALC BMI ABV UP PARAM F/U: HCPCS

## 2025-02-10 PROCEDURE — 1036F TOBACCO NON-USER: CPT

## 2025-02-10 PROCEDURE — G8427 DOCREV CUR MEDS BY ELIG CLIN: HCPCS

## 2025-02-10 PROCEDURE — 1160F RVW MEDS BY RX/DR IN RCRD: CPT

## 2025-02-10 PROCEDURE — 3078F DIAST BP <80 MM HG: CPT

## 2025-02-10 RX ORDER — OMEPRAZOLE 40 MG/1
40 CAPSULE, DELAYED RELEASE ORAL DAILY
Qty: 30 CAPSULE | Refills: 11 | Status: SHIPPED | OUTPATIENT
Start: 2025-02-10

## 2025-02-10 ASSESSMENT — ENCOUNTER SYMPTOMS
CONSTIPATION: 0
EYES NEGATIVE: 1
SHORTNESS OF BREATH: 1
COUGH: 0
NAUSEA: 0
TROUBLE SWALLOWING: 0
ANAL BLEEDING: 0
BLOOD IN STOOL: 0
VOMITING: 0
VOICE CHANGE: 0
ALLERGIC/IMMUNOLOGIC NEGATIVE: 1
ABDOMINAL PAIN: 1
DIARRHEA: 0
CHOKING: 0

## 2025-02-10 NOTE — PATIENT INSTRUCTIONS
to stop eating solid foods and drink only clear liquids. You can drink water, clear juices, clear broths, flavored ice pops, and gelatin (such as Jell-O). Do not eat or drink anything red or purple. This includes grape juice and grape-flavored ice pops. It also includes fruit punch and cherry gelatin.     Drink the \"colon prep\" liquid as your doctor tells you. You will want to stay home, because the liquid will make you go to the bathroom a lot. Your stools will be loose and watery. It's very important to drink all of the liquid. If you have problems drinking it, call your doctor.     Do not eat any solid foods after you drink the colon prep.     Stop drinking clear liquids for a few hours before the test. Your doctor will tell you how many hours this will be.   What happens on the day of the procedure?   Follow the instructions exactly about when to stop eating and drinking. If you don't, your procedure may be canceled. If your doctor told you to take your medicines on the day of the procedure, take them with only a sip of water.     Take a bath or shower before you come in for your procedure. Do not apply lotions, perfumes, deodorants, or nail polish.     Take off all jewelry and piercings. And take out contact lenses, if you wear them.   At the doctor's office or hospital   Bring a picture ID.     You will be kept comfortable and safe by your anesthesia provider. The anesthesia may make you sleep.     You will lie on your back or your side with your knees drawn up toward your belly. The doctor will gently put a gloved finger into your anus. Then the doctor puts the scope in and moves it into your colon. The scope goes in easily because it is lubricated.     The doctor may also use small tools to take tissue samples for a biopsy or to remove polyps. This does not hurt.     The test usually takes 30 to 45 minutes. But it may take longer. It depends on what is found and what is done.   When should you call your

## 2025-02-10 NOTE — PROGRESS NOTES
Subjective:     Patient ID: Zoey Alanis is a 75 y.o. male  PCP: Andrea Mistry MD  Referring Provider: Andrea Mitsry MD    HPI  Patient presents to the office today with the following complaints: Anemia      Patient seen in the office today. He was referred for early satiety and iron deficiency anemia. He also states that he is having GERD. He went to the ER for chest pain but cardiac was ruled out with echocardiogram and stress test. He is currently taking Omeprazole 20 mg daily. He states it is worse after eating. He is having an appetite change and he states he has lost 3 pounds in the last couple weeks. Had prostate surgery the last of September. He gets SOA with little exertion. He denies and nausea or vomiting. He denies diarrhea or constipation. No blood or black stools. No hematemesis.     Last EGD -never    Last Colonoscopy -3/28/2022- Dr GRETA Fulton-Diverticular disease, tattoo in the rectum, no residual polyp/lesion seen, lipoma in right colon, 5 yr recall     Denies Family hx colon cancer/colon polyps   Family hx of stomach cancer: Mother  Family hx of pancreatic cancer: Father    Lab Results   Component Value Date    WBC 6.7 02/10/2025    HGB 10.5 (L) 02/10/2025    HCT 34.9 (L) 02/10/2025    MCV 88.1 02/10/2025     02/10/2025    LYMPHOPCT 18.1 (L) 02/10/2025    RBC 3.96 (L) 02/10/2025    MCH 26.5 (L) 02/10/2025    MCHC 30.1 (L) 02/10/2025    RDW 17.3 (H) 02/10/2025       Lab Results   Component Value Date     02/10/2025    K 4.6 02/10/2025     02/10/2025    CO2 24 02/10/2025    BUN 20 02/10/2025    CREATININE 1.0 02/10/2025    GLUCOSE 80 02/10/2025    CALCIUM 9.4 02/10/2025    BILITOT 0.4 02/10/2025    ALKPHOS 78 02/10/2025    AST 25 02/10/2025    ALT 22 02/10/2025    LABGLOM 78 02/10/2025     Lipase: 37 on 1/19/2025        Assessment:     1. Iron deficiency anemia, unspecified iron deficiency anemia type    2. Early satiety    3. Gastroesophageal reflux disease, unspecified

## 2025-02-24 ENCOUNTER — TELEPHONE (OUTPATIENT)
Dept: GASTROENTEROLOGY | Age: 75
End: 2025-02-24

## 2025-04-17 ENCOUNTER — APPOINTMENT (OUTPATIENT)
Dept: OPERATING ROOM | Age: 75
End: 2025-04-17
Attending: INTERNAL MEDICINE

## 2025-04-17 ENCOUNTER — HOSPITAL ENCOUNTER (OUTPATIENT)
Age: 75
Setting detail: SPECIMEN
Discharge: HOME | End: 2025-04-17
Payer: MEDICARE

## 2025-04-17 ENCOUNTER — HOSPITAL ENCOUNTER (OUTPATIENT)
Age: 75
Setting detail: OUTPATIENT SURGERY
Discharge: HOME | End: 2025-04-17
Attending: INTERNAL MEDICINE | Admitting: INTERNAL MEDICINE

## 2025-04-17 ENCOUNTER — ANESTHESIA EVENT (OUTPATIENT)
Dept: OPERATING ROOM | Age: 75
End: 2025-04-17

## 2025-04-17 ENCOUNTER — ANESTHESIA (OUTPATIENT)
Dept: OPERATING ROOM | Age: 75
End: 2025-04-17

## 2025-04-17 VITALS
HEIGHT: 66 IN | SYSTOLIC BLOOD PRESSURE: 168 MMHG | HEART RATE: 51 BPM | WEIGHT: 191 LBS | RESPIRATION RATE: 18 BRPM | DIASTOLIC BLOOD PRESSURE: 69 MMHG | BODY MASS INDEX: 30.7 KG/M2 | TEMPERATURE: 96.8 F | OXYGEN SATURATION: 97 %

## 2025-04-17 PROCEDURE — 45385 COLONOSCOPY W/LESION REMOVAL: CPT

## 2025-04-17 PROCEDURE — G8907 PT DOC NO EVENTS ON DISCHARG: HCPCS

## 2025-04-17 PROCEDURE — 88305 TISSUE EXAM BY PATHOLOGIST: CPT

## 2025-04-17 PROCEDURE — G8917 PT W IV AB NOT GIVEN ON TIME: HCPCS

## 2025-04-17 PROCEDURE — 88342 IMHCHEM/IMCYTCHM 1ST ANTB: CPT

## 2025-04-17 PROCEDURE — 43239 EGD BIOPSY SINGLE/MULTIPLE: CPT

## 2025-04-17 RX ORDER — PROPOFOL 10 MG/ML
INJECTION, EMULSION INTRAVENOUS
Status: DISCONTINUED | OUTPATIENT
Start: 2025-04-17 | End: 2025-04-17 | Stop reason: SDUPTHER

## 2025-04-17 RX ORDER — LIDOCAINE HYDROCHLORIDE 10 MG/ML
INJECTION, SOLUTION EPIDURAL; INFILTRATION; INTRACAUDAL; PERINEURAL
Status: DISCONTINUED | OUTPATIENT
Start: 2025-04-17 | End: 2025-04-17 | Stop reason: SDUPTHER

## 2025-04-17 RX ORDER — SODIUM CHLORIDE, SODIUM LACTATE, POTASSIUM CHLORIDE, CALCIUM CHLORIDE 600; 310; 30; 20 MG/100ML; MG/100ML; MG/100ML; MG/100ML
INJECTION, SOLUTION INTRAVENOUS CONTINUOUS
Status: DISCONTINUED | OUTPATIENT
Start: 2025-04-17 | End: 2025-04-17 | Stop reason: HOSPADM

## 2025-04-17 RX ADMIN — LIDOCAINE HYDROCHLORIDE 50 MG: 10 INJECTION, SOLUTION EPIDURAL; INFILTRATION; INTRACAUDAL; PERINEURAL at 09:47

## 2025-04-17 RX ADMIN — SODIUM CHLORIDE, SODIUM LACTATE, POTASSIUM CHLORIDE, CALCIUM CHLORIDE: 600; 310; 30; 20 INJECTION, SOLUTION INTRAVENOUS at 09:27

## 2025-04-17 RX ADMIN — PROPOFOL 200 MG: 10 INJECTION, EMULSION INTRAVENOUS at 09:47

## 2025-04-17 ASSESSMENT — PAIN - FUNCTIONAL ASSESSMENT
PAIN_FUNCTIONAL_ASSESSMENT: NONE - DENIES PAIN

## 2025-04-17 NOTE — DISCHARGE INSTRUCTIONS
EGD RECOMMENDATIONS:    1.  Await path results  2.  Continue Prilosec daily    Colonoscopy Recommendations:  1. Repeat colonoscopy: pending pathology - at the age of 79 due to hx of polyps.  2. Await biopsy results.    POST-OP ORDERS: ENDOSCOPY & COLONOSCOPY:  1. Rest today.  2. DO NOT eat or drink until wide awake; eat your usual diet today in moderate amount only.  3. DO NOT drive today.  4. Call physician if you have severe pain, vomiting, fever, rectal bleeding or black bowel movements.  5.  If a biopsy was taken or a polyp removed, you should expect to hear results in about 21 days.  If you have heard nothing from your physician by then, call the office for results.  6.  Discharge home when patient awake, vitals signs stable and tolerating liquids.  7. Call with questions or concerns [phone].

## 2025-04-17 NOTE — H&P
Patient Name: Zoey Alanis  : 1950  MRN: [id number]  DATE: 25    Allergies:   Allergies   Allergen Reactions    Latex Hives     blisters    Dapagliflozin Other (See Comments)     Other Reaction(s): Blood in Urine    Jardiance [Empagliflozin] Other (See Comments)     Blood in urine        ENDOSCOPY  History and Physical    Procedure:    [x] Diagnostic Colonoscopy       [] Screening Colonoscopy  [x] EGD      [] ERCP      [] EUS       [] Other    [x] Previous office notes/History and Physical reviewed from the patients chart. Please see EMR for further details of HPI. I have examined the patient's status immediately prior to the procedure and:      Indications/HPI:    [x]Abdominal Pain   []Barretts  []Screening/Surveillance   []History of Polyps  []Dysphagia            [] +Cologard/DNA testing  []Abnormal Imaging              []EOE Hx              [] Family Hx of CRC/Polyps  [x]Anemia                            []Food Impaction       []Recent Poor Prep  []GI Bleed             []Lymphadenopathy  []History of Polyps  []Change in bowel habits [x]Heartburn/Reflux  []Cancer- GI/Lung  []Chest Pain - Non Cardiac []Heme (+) Stool []Ulcers  []Constipation  []Hemoptysis  []Incontinence    []Diarrhea  []Hypoxemia  []Rectal Bleed (BRBPR)  []Nausea/Vomiting   [] Varices  []Crohns/Colitis  []Pancreatic Cyst   [] Cirrhosis   []Pancreatitis    []Abnormal MRCP  []Elevated LFT [] Stent Removal, Previous ERCP  []Other:     Anesthesia:   [x] MAC [] Moderate Sedation   [] General   [] None     ROS: 12 pt Review of Symptoms was negative unless mentioned above    Medications:   Prior to Admission medications    Medication Sig Start Date End Date Taking? Authorizing Provider   omeprazole (PRILOSEC) 40 MG delayed release capsule Take 1 capsule by mouth daily Take first thing daily on an empty stomach. 2/10/25   Evette Baker APRN - CNP   tamsulosin (FLOMAX) 0.4 MG capsule Take 1 capsule by mouth at bedtime 24

## 2025-04-17 NOTE — ANESTHESIA PRE PROCEDURE
Department of Anesthesiology  Preprocedure Note       Name:  Zoey Alanis   Age:  75 y.o.  :  1950                                          MRN:  [id number]         Date:  2025      Surgeon: Surgeon(s):  Redd Fulton MD    Procedure: Procedure(s):  ESOPHAGOGASTRODUODENOSCOPY BIOPSY  COLONOSCOPY DIAGNOSTIC    Medications prior to admission:   Prior to Admission medications    Medication Sig Start Date End Date Taking? Authorizing Provider   metoprolol tartrate (LOPRESSOR) 25 MG tablet Take 1 tablet by mouth 2 times daily 24  Yes Henok Randhawa MD   verapamil (CALAN) 120 MG tablet Take 1 tablet by mouth daily   Yes Henok Randhawa MD   omeprazole (PRILOSEC) 40 MG delayed release capsule Take 1 capsule by mouth daily Take first thing daily on an empty stomach. 2/10/25   Evette Baker APRN - CNP   tamsulosin (FLOMAX) 0.4 MG capsule Take 1 capsule by mouth at bedtime 24   Kya Kovacs APRN - CNP   aspirin 81 MG tablet Take 1 tablet by mouth daily HOLD UNTIL CATH REMOVED 24   Kya Kovacs APRN - CNP   pioglitazone (ACTOS) 30 MG tablet Take 1 tablet by mouth Every Day    Henok Randhawa MD   insulin glargine (BASAGLAR KWIKPEN) 100 UNIT/ML injection pen Inject 30 Units into the skin nightly    Henok Randhawa MD   Rosuvastatin Calcium 10 MG CPSP Take 1 capsule by mouth nightly    Henok Randhawa MD   nitroGLYCERIN (NITROSTAT) 0.4 MG SL tablet Place 1 tablet under the tongue every 5 minutes as needed for Chest pain up to max of 3 total doses. If no relief after 1 dose, call 911.    Henok Randhawa MD   Magnesium 400 MG TABS Take 1 tablet by mouth daily    Henok Randhawa MD   multivitamin (THERAGRAN) per tablet Take 1 tablet by mouth daily    Henok Randhawa MD       Current medications:    Current Facility-Administered Medications   Medication Dose Route Frequency Provider Last Rate Last Admin    lactated ringers infusion

## 2025-04-17 NOTE — OP NOTE
Endoscopic Procedure Note    Patient: Zoey Alanis : 1950  [facility] Rec#: [id number] Acc#: [id number]     Primary Care Provider Andrea Mistry MD  Referring Provider: KATI Rai    Endoscopist: Redd Fulton MD    Date of Procedure:  2025    Procedure:   1. EGD with biopsy    Indications:   1. Anemia   2. Non-cardiac chest pain    Anesthesia:  Sedation was administered by anesthesia who monitored the patient during the procedure.    Estimated Blood Loss: minimal    Procedure:   After reviewing the patient's chart and obtaining informed consent, the patient was placed in the left lateral decubitus position.  A forward-viewing Olympus endoscope was lubricated and inserted through the mouth into the oropharynx. Under direct visualization, the upper esophagus was intubated. The scope was advanced to the level of the third portion of duodenum. Scope was slowly withdrawn with careful inspection of the mucosal surfaces. The scope was retroflexed for inspection of the gastric fundus and incisura. Findings and maneuvers are listed in impression below. The patient tolerated the procedure well. The scope was removed. There were no immediate complications.    Findings:   Esophagus: normal    There is no hiatal hernia present.      Stomach:  Abnormal: Mild mucosal changes suggestive of gastritis noted -  Gastric biopsies were taken from the antrum and body to rule out Helicobacter pylori infection.    There are a few benign appearing polyps in the stomach, consistent with fundic gland polyps.      Duodenum: normal      IMPRESSION:  1. Gastritis- biopsied      RECOMMENDATIONS:    1.  Await path results  2.  Continue Prilosec daily  3.  Colonoscopy today    The results were discussed with the patient and family.  A copy of the images obtained were given to the patient.     ICarolina, magui scribing for and in the presence of Dr. Redd Fulton MD.  Electronically signed by Carolina Romero RN on

## 2025-04-17 NOTE — ANESTHESIA PRE PROCEDURE
Department of Anesthesiology  Preprocedure Note       Name:  Zoey Alanis   Age:  75 y.o.  :  1950                                          MRN:  [id number]         Date:  2025      Surgeon: Surgeon(s):  Redd Fulton MD    Procedure: Procedure(s):  ESOPHAGOGASTRODUODENOSCOPY BIOPSY  COLONOSCOPY DIAGNOSTIC    Medications prior to admission:   Prior to Admission medications    Medication Sig Start Date End Date Taking? Authorizing Provider   metoprolol tartrate (LOPRESSOR) 25 MG tablet Take 1 tablet by mouth 2 times daily 24  Yes Henok Randhawa MD   verapamil (CALAN) 120 MG tablet Take 1 tablet by mouth daily   Yes Henok Randhawa MD   omeprazole (PRILOSEC) 40 MG delayed release capsule Take 1 capsule by mouth daily Take first thing daily on an empty stomach. 2/10/25   Evette Baker APRN - CNP   tamsulosin (FLOMAX) 0.4 MG capsule Take 1 capsule by mouth at bedtime 24   Kya Kovacs APRN - CNP   aspirin 81 MG tablet Take 1 tablet by mouth daily HOLD UNTIL CATH REMOVED 24   Kya Kovacs APRN - CNP   pioglitazone (ACTOS) 30 MG tablet Take 1 tablet by mouth Every Day    Henok Randhawa MD   insulin glargine (BASAGLAR KWIKPEN) 100 UNIT/ML injection pen Inject 30 Units into the skin nightly    Henok aRndhawa MD   Rosuvastatin Calcium 10 MG CPSP Take 1 capsule by mouth nightly    Henok Randhawa MD   nitroGLYCERIN (NITROSTAT) 0.4 MG SL tablet Place 1 tablet under the tongue every 5 minutes as needed for Chest pain up to max of 3 total doses. If no relief after 1 dose, call 911.    Henok Randhawa MD   Magnesium 400 MG TABS Take 1 tablet by mouth daily    Henok Randhawa MD   multivitamin (THERAGRAN) per tablet Take 1 tablet by mouth daily    Henok Randhawa MD       Current medications:    Current Facility-Administered Medications   Medication Dose Route Frequency Provider Last Rate Last Admin   • lactated ringers infusion

## 2025-04-17 NOTE — OP NOTE
Patient: Zoey Alanis : 1950  [facility] Rec#: [id number] Acc#: [id number]   Primary Care Provider Andrea Mistry MD    Date of Procedure:  2025    Endoscopist: Redd Fulton MD    Referring Provider: Andrea Mistry MD, KATI Rai    Operation Performed: Colonoscopy with snare polypectomy    Indications: Anemia    Anesthesia:  Sedation was administered by anesthesia who monitored the patient during the procedure.    I met with Zoey Alanis prior to procedure. We discussed the procedure itself, and I have discussed the risks of endoscopy (including-- but not limited to-- pain, discomfort, bleeding potentially requiring second endoscopic procedure and/or blood transfusion, organ perforation requiring operative repair, damage to organs near the colon, infection, aspiration, cardiopulmonary/allergic reaction), benefits, indications to endoscopy. Additionally, we discussed options other than colonoscopy. The patient expressed understanding. All questions answered. The patient decided to proceed with the procedure.  Signed informed consent was placed on the chart.    Blood Loss: minimal    Withdrawal time: n/a  Bowel Prep: adequate     Complications: no immediate complications    DESCRIPTION OF PROCEDURE:     A time out was performed. After written informed consent was obtained, the patient was placed in the left lateral position.     The perianal area was inspected, and a digital rectal exam was performed. A rectal exam was performed: normal tone, no palpable lesions. At this point, a forward viewing Olympus colonoscope was inserted into the anus and carefully advanced to the cecum.  The cecum was identified by the ileocecal valve and the appendiceal orifice. The colonoscope was then slowly withdrawn with careful inspection of the mucosa in a linear and circumferential fashion. The scope was retroflexed in the rectum. Suction was utilized during the procedure to remove as much air as

## 2025-04-17 NOTE — ANESTHESIA POSTPROCEDURE EVALUATION
Department of Anesthesiology  Postprocedure Note    Patient: Zoey Alanis  MRN: [id number]  YOB: 1950  Date of evaluation: 4/17/2025    Procedure Summary       Date: 04/17/25 Room / Location: [place] [address] / [facility] [place]    Anesthesia Start: 0941 Anesthesia Stop:     Procedures:       ESOPHAGOGASTRODUODENOSCOPY BIOPSY (Esophagus)      COLONOSCOPY DIAGNOSTIC (Abdomen) Diagnosis:       Gastroesophageal reflux disease, unspecified whether esophagitis present      Epigastric pain      Anemia, unspecified type      History of diverticulitis    Surgeons: Redd Fulton MD Responsible Provider: Sonia Elias APRN - CRNA    Anesthesia Type: general, TIVA ASA Status: 3            Anesthesia Type: No value filed.    Joelle Phase I:      Joelle Phase II:      Anesthesia Post Evaluation    Patient location during evaluation: bedside  Patient participation: complete - patient participated  Level of consciousness: sleepy but conscious  Pain score: 0  Airway patency: patent  Nausea & Vomiting: no nausea and no vomiting  Cardiovascular status: blood pressure returned to baseline  Respiratory status: acceptable, room air and spontaneous ventilation  Hydration status: euvolemic  Pain management: adequate    No notable events documented.

## 2025-04-21 ENCOUNTER — RESULTS FOLLOW-UP (OUTPATIENT)
Dept: GASTROENTEROLOGY | Age: 75
End: 2025-04-21

## 2025-04-22 ENCOUNTER — TELEPHONE (OUTPATIENT)
Dept: CARDIOLOGY | Facility: CLINIC | Age: 75
End: 2025-04-22
Payer: MEDICARE

## 2025-04-22 NOTE — TELEPHONE ENCOUNTER
"----- Message from Sonia Pathak sent at 4/21/2025  3:47 PM CDT -----  Ok there are 2 openings Thursday and 1 on Wednesday  ----- Message -----  From: Madison Stuart RN  Sent: 4/21/2025   2:25 PM CDT  To: SABI Rodriguez; Dmitry Kebede MA    Spouse has called again about recurrent chest pressure, pain while \"walking\". Patient does mow a few yards and is not bothered with chest pain while riding a mower. But he can  sticks and use weed eater with no problemsHe is scheduled for 06/2025. There is a thread on 1/20/2025 if patient called in sooner he would be squeezed in for scheduling.  "

## 2025-04-24 ENCOUNTER — OFFICE VISIT (OUTPATIENT)
Dept: CARDIOLOGY | Facility: CLINIC | Age: 75
End: 2025-04-24
Payer: MEDICARE

## 2025-04-24 VITALS
SYSTOLIC BLOOD PRESSURE: 134 MMHG | HEIGHT: 67 IN | HEART RATE: 60 BPM | DIASTOLIC BLOOD PRESSURE: 66 MMHG | OXYGEN SATURATION: 98 % | BODY MASS INDEX: 30.1 KG/M2 | WEIGHT: 191.8 LBS

## 2025-04-24 DIAGNOSIS — Z95.1 S/P CABG X 3: ICD-10-CM

## 2025-04-24 DIAGNOSIS — Z79.4 TYPE 2 DIABETES MELLITUS WITH OTHER CIRCULATORY COMPLICATION, WITH LONG-TERM CURRENT USE OF INSULIN: ICD-10-CM

## 2025-04-24 DIAGNOSIS — I10 PRIMARY HYPERTENSION: ICD-10-CM

## 2025-04-24 DIAGNOSIS — I25.10 CORONARY ARTERY DISEASE INVOLVING NATIVE HEART WITHOUT ANGINA PECTORIS, UNSPECIFIED VESSEL OR LESION TYPE: Primary | ICD-10-CM

## 2025-04-24 DIAGNOSIS — E11.59 TYPE 2 DIABETES MELLITUS WITH OTHER CIRCULATORY COMPLICATION, WITH LONG-TERM CURRENT USE OF INSULIN: ICD-10-CM

## 2025-04-24 DIAGNOSIS — E78.2 MIXED HYPERLIPIDEMIA: ICD-10-CM

## 2025-04-24 PROCEDURE — 1159F MED LIST DOCD IN RCRD: CPT | Performed by: NURSE PRACTITIONER

## 2025-04-24 PROCEDURE — 3078F DIAST BP <80 MM HG: CPT | Performed by: NURSE PRACTITIONER

## 2025-04-24 PROCEDURE — 3075F SYST BP GE 130 - 139MM HG: CPT | Performed by: NURSE PRACTITIONER

## 2025-04-24 PROCEDURE — 1160F RVW MEDS BY RX/DR IN RCRD: CPT | Performed by: NURSE PRACTITIONER

## 2025-04-24 PROCEDURE — 93000 ELECTROCARDIOGRAM COMPLETE: CPT | Performed by: NURSE PRACTITIONER

## 2025-04-24 PROCEDURE — 99214 OFFICE O/P EST MOD 30 MIN: CPT | Performed by: NURSE PRACTITIONER

## 2025-04-24 RX ORDER — ISOSORBIDE MONONITRATE 30 MG/1
30 TABLET, EXTENDED RELEASE ORAL DAILY
Qty: 30 TABLET | Refills: 11 | Status: SHIPPED | OUTPATIENT
Start: 2025-04-24

## 2025-04-24 RX ORDER — METOPROLOL TARTRATE 25 MG/1
12.5 TABLET, FILM COATED ORAL 2 TIMES DAILY
COMMUNITY

## 2025-04-24 NOTE — PROGRESS NOTES
Subjective:     Encounter Date: 4/24/2025      Patient ID: Sheeba Saunders is a 75 y.o. male.    Chief Complaint: Follow-up CAD    History of Present Illness     The patient presents to follow-up regarding his history of coronary artery disease status post three-vessel CABG in 2010.  At his visit in February 2017 an attempt was made to get him on a PCSK9 inhibitor for hyperlipidemia since he had previously been intolerant to Crestor and Vytorin.  He did receive 2 injections, but later his insurance coverage was not sufficient and he was left with too much out-of-pocket cost, resulting in discontinuation of the medication.  His PCP later put him on low-dose Crestor and he was doing well with this.      Since that time, he has done very well at his annual visits without any chest discomfort or shortness of breath.  He does have some chronic lower extremity edema, more so on the left as of his last office visit.    By way of review, he was in the ER in January with chest pain.  He had normal troponins, normal BNP.  He had a low risk dobutamine stress echocardiogram.    Today the patient presents for follow-up and states he has been experiencing chest tightness since early January.  It is a left-sided chest tightness that occurs after approximately 5 minutes of walking.  It is relieved within 5 to 10 minutes of rest.  He has not been requiring nitroglycerin.  He states he can mow on his riding mower and use the weedeater without any symptoms but the chest tightness occurs pretty predictably when walking.  He states this is different compared to his symptoms prior to CABG because back then his left arm was hurting and he is not having any left arm pain or radiation of the pain or other associated symptoms at this point other than he does have some exertional dyspnea associated with the chest discomfort.  To his knowledge his blood pressure has been well-controlled.  He is compliant with his medications.  He denies  orthopnea, PND, syncope, presyncope.        The following portions of the patient's history were reviewed and updated as appropriate: allergies, current medications, past family history, past medical history, past social history, past surgical history and problem list.    Review of Systems   Constitutional: Negative for malaise/fatigue.   Cardiovascular:  Positive for chest pain and dyspnea on exertion. Negative for claudication, near-syncope, orthopnea, palpitations, paroxysmal nocturnal dyspnea and syncope.   Respiratory:  Negative for cough.    Hematologic/Lymphatic: Does not bruise/bleed easily.   Musculoskeletal:  Negative for falls.   Gastrointestinal:  Negative for bloating.   Neurological:  Negative for dizziness, light-headedness and weakness.       Allergies   Allergen Reactions    Dapagliflozin Other (See Comments)    Latex Hives     blisters         Current Outpatient Medications:     aspirin 81 MG EC tablet, Take 1 tablet by mouth Daily., Disp: , Rfl:     Insulin Glargine (BASAGLAR KWIKPEN) 100 UNIT/ML injection pen, Inject 30 Units under the skin into the appropriate area as directed Daily., Disp: , Rfl:     magnesium oxide (MAG-OX) 400 MG tablet, Take 1 tablet by mouth Daily., Disp: , Rfl:     metoprolol tartrate (LOPRESSOR) 25 MG tablet, Take 0.5 tablets by mouth 2 (Two) Times a Day., Disp: , Rfl:     Multiple Vitamin (MULTIVITAMINS PO), Take 1 tablet by mouth Daily., Disp: , Rfl:     nitroglycerin (NITROSTAT) 0.4 MG SL tablet, Place 1 tablet under the tongue Every 5 (Five) Minutes As Needed for Chest Pain. Take no more than 3 doses in 15 minutes., Disp: , Rfl:     omeprazole (priLOSEC) 20 MG capsule, Take 1 capsule by mouth Daily., Disp: , Rfl:     pioglitazone (ACTOS) 30 MG tablet, Take 1 tablet by mouth Daily., Disp: , Rfl:     rosuvastatin (CRESTOR) 10 MG tablet, Take 1 tablet by mouth Daily., Disp: , Rfl:     verapamil (CALAN) 120 MG tablet, Take 1 tablet by mouth Daily., Disp: , Rfl:         "  Objective:    /66   Pulse 60   Ht 170.2 cm (67\")   Wt 87 kg (191 lb 12.8 oz)   SpO2 98%   BMI 30.04 kg/m²        Vitals and nursing note reviewed.   Constitutional:       General: Not in acute distress.     Appearance: Well-developed and not in distress. Not diaphoretic.   Neck:      Vascular: No JVD.   Pulmonary:      Effort: Pulmonary effort is normal. No respiratory distress.      Breath sounds: Normal breath sounds.   Cardiovascular:      Normal rate. Regular rhythm.      Murmurs: There is no murmur.   Edema:     Peripheral edema absent.   Abdominal:      Tenderness: There is no abdominal tenderness.   Skin:     General: Skin is warm and dry.   Neurological:      Mental Status: Alert and oriented to person, place, and time.         Lab Review:            ECG 12 Lead    Date/Time: 4/24/2025 10:57 AM  Performed by: Sonia Pathak APRN    Authorized by: Sonia Pathak APRN  Comparison: compared with previous ECG from 1/19/2025  Similar to previous ECG  Rhythm: sinus rhythm  Ectopy: atrial premature contractions  BPM: 60    Clinical impression: abnormal EKG          Results for orders placed during the hospital encounter of 01/19/25    Adult Stress Echo W/ Cont or Stress Agent if Necessary Per Protocol    Interpretation Summary    Baseline ECG of normal sinus rhythm noted at rest. Non-specific ST-T wave changes noted.    With dobutamine infusion, no clinically significant ST segment deviation noted.    Post stress wall motion appears to be normal.    This is considered to represent a low risk dobutamine stress echocardiogram with no significant ECG or echocardiographic evidence for myocardial ischemia.      Assessment:      Problem List Items Addressed This Visit          Cardiac and Vasculature    S/P CABG x 3    HLD (hyperlipidemia)    CAD (coronary artery disease) - Primary    Overview   3v CABG '10 (LIMA-LAD, SVG-D1, SVG-PDA) with subsequent low-risk stress echo in '11)         Relevant Medications "    metoprolol tartrate (LOPRESSOR) 25 MG tablet    HTN (hypertension)    Relevant Medications    metoprolol tartrate (LOPRESSOR) 25 MG tablet       Endocrine and Metabolic    DM (diabetes mellitus)     Plan:     1.  Coronary artery disease: Established problem.  See notes above regarding his exertional chest tightness that developed in early January.    -Continue aspirin 81 mg daily indefinitely  -Continue Crestor 10 mg nightly-previously intolerant to higher doses  -Continue current doses of beta-blocker and calcium channel blocker  - As needed sublingual nitroglycerin, he confirms he has this available for use and it is not .    -Add Imdur 30 mg daily.  - Although ACS was recently ruled out in the ER and his dobutamine stress echocardiogram was low risk, he does describe symptoms consistent with stable angina.  - We reviewed signs and symptoms consistent with ACS and when to take nitroglycerin and when to report to the ER.  He voices understanding.  - If he continues to have lifestyle limiting symptoms despite antianginal therapy, will discuss possibility of cardiac catheterization with Dr. Bennett.      2.  Essential hypertension: Established problem, well-controlled.  Continue current medical therapy.    3.  Mixed hyperlipidemia: Established problem, stable.  Continue rosuvastatin 10 mg nightly for goal LDL of less than 70.  He has previously been intolerant to higher doses of rosuvastatin.  He previously had cost/coverage issues with PCSK9 inhibitor.  If unable to get to goal or remain at goal on current regimen consider Zetia or Leqvio depending upon panel results.  Recent lipid panel currently unavailable to me.    4.  Diabetes mellitus type 2: Established problem, recent A1c currently unavailable to me.  As previously noted, he reported he cannot take SGLT2 inhibitors due to prior genitourinary issues when taking Farxiga in the past.    Follow-up in 1 to 2 weeks, call sooner with symptoms or  concerns

## 2025-05-08 ENCOUNTER — OFFICE VISIT (OUTPATIENT)
Dept: CARDIOLOGY | Facility: CLINIC | Age: 75
End: 2025-05-08
Payer: MEDICARE

## 2025-05-08 VITALS
WEIGHT: 196 LBS | OXYGEN SATURATION: 93 % | SYSTOLIC BLOOD PRESSURE: 136 MMHG | HEART RATE: 55 BPM | HEIGHT: 67 IN | DIASTOLIC BLOOD PRESSURE: 62 MMHG | BODY MASS INDEX: 30.76 KG/M2

## 2025-05-08 DIAGNOSIS — E78.2 MIXED HYPERLIPIDEMIA: ICD-10-CM

## 2025-05-08 DIAGNOSIS — E11.59 TYPE 2 DIABETES MELLITUS WITH OTHER CIRCULATORY COMPLICATION, WITH LONG-TERM CURRENT USE OF INSULIN: ICD-10-CM

## 2025-05-08 DIAGNOSIS — I25.118 CORONARY ARTERY DISEASE OF NATIVE HEART WITH STABLE ANGINA PECTORIS, UNSPECIFIED VESSEL OR LESION TYPE: Primary | ICD-10-CM

## 2025-05-08 DIAGNOSIS — Z79.4 TYPE 2 DIABETES MELLITUS WITH OTHER CIRCULATORY COMPLICATION, WITH LONG-TERM CURRENT USE OF INSULIN: ICD-10-CM

## 2025-05-08 DIAGNOSIS — I10 PRIMARY HYPERTENSION: ICD-10-CM

## 2025-05-08 DIAGNOSIS — Z95.1 S/P CABG X 3: ICD-10-CM

## 2025-05-08 PROCEDURE — 3075F SYST BP GE 130 - 139MM HG: CPT | Performed by: NURSE PRACTITIONER

## 2025-05-08 PROCEDURE — 1160F RVW MEDS BY RX/DR IN RCRD: CPT | Performed by: NURSE PRACTITIONER

## 2025-05-08 PROCEDURE — 1159F MED LIST DOCD IN RCRD: CPT | Performed by: NURSE PRACTITIONER

## 2025-05-08 PROCEDURE — 3078F DIAST BP <80 MM HG: CPT | Performed by: NURSE PRACTITIONER

## 2025-05-08 PROCEDURE — 93000 ELECTROCARDIOGRAM COMPLETE: CPT | Performed by: NURSE PRACTITIONER

## 2025-05-08 PROCEDURE — 99214 OFFICE O/P EST MOD 30 MIN: CPT | Performed by: NURSE PRACTITIONER

## 2025-05-08 RX ORDER — ISOSORBIDE MONONITRATE 60 MG/1
60 TABLET, EXTENDED RELEASE ORAL DAILY
Qty: 30 TABLET | Refills: 11 | Status: SHIPPED | OUTPATIENT
Start: 2025-05-08

## 2025-05-08 NOTE — PROGRESS NOTES
Subjective:     Encounter Date: 5/8/2025      Patient ID: Sheeba Saunders is a 75 y.o. male.    Chief Complaint: Follow-up CAD    History of Present Illness     The patient presents to follow-up regarding his history of coronary artery disease status post three-vessel CABG in 2010.  At his visit in February 2017 an attempt was made to get him on a PCSK9 inhibitor for hyperlipidemia since he had previously been intolerant to Crestor and Vytorin.  He did receive 2 injections, but later his insurance coverage was not sufficient and he was left with too much out-of-pocket cost, resulting in discontinuation of the medication.  His PCP later put him on low-dose Crestor and he was doing well with this.      Since that time, he has done very well at his annual visits without any chest discomfort or shortness of breath.  He does have some chronic lower extremity edema, more so on the left as of his last office visit.    By way of review, he was in the ER in January with chest pain.  He had normal troponins, normal BNP.  He had a low risk dobutamine stress echocardiogram.    He came back to see me on 4/24 and reported he had been experiencing chest tightness since early January.  He described this as a left-sided chest tightness that occurred after approximately 5 minutes of walking.  It would be relieved with 5 to 10 minutes of rest.  He was not taking nitroglycerin.  He was able to mow on his riding mower and use his weedeater without symptoms.  He reported this was different compared to the symptoms he had prior to CABG, because prior to CABG she also had left arm pain and had not been having any of that in recent months.  He did have some associated exertional dyspnea.  At that visit, I added Imdur 30 mg daily for what was felt to be consistent with  stable angina.    Today the patient states he is doing much better since starting Imdur.  He states episodes are occurring infrequently and are much less severe.  Most  days he can walk 18 to 20 minutes at a time with no chest pain.  However, as his symptoms are not completely resolved he is interested in further adjustments in medical therapy.  He continues to deny any chest discomfort that starts at rest.        The following portions of the patient's history were reviewed and updated as appropriate: allergies, current medications, past family history, past medical history, past social history, past surgical history and problem list.    Review of Systems   Constitutional: Negative for malaise/fatigue.   Cardiovascular:  Positive for chest pain. Negative for claudication, dyspnea on exertion, leg swelling, near-syncope, orthopnea, palpitations, paroxysmal nocturnal dyspnea and syncope.   Respiratory:  Negative for cough and shortness of breath.    Hematologic/Lymphatic: Does not bruise/bleed easily.   Musculoskeletal:  Negative for falls.   Gastrointestinal:  Negative for bloating.   Neurological:  Negative for dizziness, light-headedness and weakness.       Allergies   Allergen Reactions    Dapagliflozin Other (See Comments)    Latex Hives     blisters         Current Outpatient Medications:     aspirin 81 MG EC tablet, Take 1 tablet by mouth Daily., Disp: , Rfl:     Insulin Glargine (BASAGLAR KWIKPEN) 100 UNIT/ML injection pen, Inject 30 Units under the skin into the appropriate area as directed Daily., Disp: , Rfl:     isosorbide mononitrate (IMDUR) 60 MG 24 hr tablet, Take 1 tablet by mouth Daily., Disp: 30 tablet, Rfl: 11    magnesium oxide (MAG-OX) 400 MG tablet, Take 1 tablet by mouth Daily., Disp: , Rfl:     metoprolol tartrate (LOPRESSOR) 25 MG tablet, Take 0.5 tablets by mouth 2 (Two) Times a Day., Disp: , Rfl:     Multiple Vitamin (MULTIVITAMINS PO), Take 1 tablet by mouth Daily., Disp: , Rfl:     nitroglycerin (NITROSTAT) 0.4 MG SL tablet, Place 1 tablet under the tongue Every 5 (Five) Minutes As Needed for Chest Pain. Take no more than 3 doses in 15 minutes., Disp: ,  "Rfl:     omeprazole (priLOSEC) 20 MG capsule, Take 1 capsule by mouth Daily., Disp: , Rfl:     pioglitazone (ACTOS) 30 MG tablet, Take 1 tablet by mouth Daily., Disp: , Rfl:     rosuvastatin (CRESTOR) 10 MG tablet, Take 1 tablet by mouth Daily., Disp: , Rfl:     verapamil (CALAN) 120 MG tablet, Take 1 tablet by mouth Daily., Disp: , Rfl:          Objective:    /62   Pulse 55   Ht 170.2 cm (67\")   Wt 88.9 kg (196 lb)   SpO2 93%   BMI 30.70 kg/m²        Vitals and nursing note reviewed.   Constitutional:       General: Not in acute distress.     Appearance: Well-developed and not in distress. Not diaphoretic.   Neck:      Vascular: No JVD.   Pulmonary:      Effort: Pulmonary effort is normal. No respiratory distress.      Breath sounds: Normal breath sounds.   Cardiovascular:      Normal rate. Regular rhythm.      Murmurs: There is no murmur.   Edema:     Peripheral edema absent.   Abdominal:      Tenderness: There is no abdominal tenderness.   Skin:     General: Skin is warm and dry.   Neurological:      Mental Status: Alert and oriented to person, place, and time.         Lab Review:            ECG 12 Lead    Date/Time: 5/8/2025 2:48 PM  Performed by: Sonia Pathak APRN    Authorized by: Sonia Pathak APRN  Comparison: compared with previous ECG from 4/24/2025  Similar to previous ECG  Rhythm: sinus bradycardia  BPM: 55  Other findings: non-specific ST-T wave changes    Clinical impression: abnormal EKG          Results for orders placed during the hospital encounter of 01/19/25    Adult Stress Echo W/ Cont or Stress Agent if Necessary Per Protocol    Interpretation Summary    Baseline ECG of normal sinus rhythm noted at rest. Non-specific ST-T wave changes noted.    With dobutamine infusion, no clinically significant ST segment deviation noted.    Post stress wall motion appears to be normal.    This is considered to represent a low risk dobutamine stress echocardiogram with no significant ECG or " echocardiographic evidence for myocardial ischemia.      Assessment:      Problem List Items Addressed This Visit          Cardiac and Vasculature    S/P CABG x 3    HLD (hyperlipidemia)    CAD (coronary artery disease) - Primary    Overview   3v CABG '10 (LIMA-LAD, SVG-D1, SVG-PDA) with subsequent low-risk stress echo in ')         Relevant Medications    isosorbide mononitrate (IMDUR) 60 MG 24 hr tablet    HTN (hypertension)       Endocrine and Metabolic    DM (diabetes mellitus)     Plan:     1.  Coronary artery disease: Established problem, stable.  See notes above regarding his exertional chest tightness that developed in early January-> now improved significantly with imdur but not resolved.     -Continue aspirin 81 mg daily indefinitely  -Continue Crestor 10 mg nightly-previously intolerant to higher doses  -Continue current doses of beta-blocker and calcium channel blocker  - As needed sublingual nitroglycerin, he confirms he has this available for use and it is not .    -Uptitrate Imdur to 60 mg daily for symptom relief.    - Although ACS was recently ruled out in the ER and his dobutamine stress echocardiogram was low risk, he does describe symptoms consistent with stable angina.  - We reviewed signs and symptoms consistent with ACS and when to take nitroglycerin and when to report to the ER.  He voices understanding.  - Symptoms are stable now and improved but if he develops lifestyle limiting symptoms despite antianginal therapy, will discuss possibility of cardiac catheterization with Dr. Bennett.      2.  Essential hypertension: Established problem, well-controlled.  Continue current medical therapy.    3.  Mixed hyperlipidemia: Established problem, stable.  Continue rosuvastatin 10 mg nightly for goal LDL of less than 70.  He has previously been intolerant to higher doses of rosuvastatin.  He previously had cost/coverage issues with PCSK9 inhibitor.  If unable to get to goal or remain at goal  on current regimen consider Zetia or Leqvio depending upon panel results.  Recent lipid panel currently unavailable to me.    4.  Diabetes mellitus type 2: Established problem, recent A1c currently unavailable to me.  As previously noted, he reported he cannot take SGLT2 inhibitors due to prior genitourinary issues when taking Farxiga in the past.    Follow-up in 3 months, call sooner with symptoms or concerns

## 2025-05-21 ENCOUNTER — OFFICE VISIT (OUTPATIENT)
Dept: GASTROENTEROLOGY | Age: 75
End: 2025-05-21
Payer: MEDICARE

## 2025-05-21 VITALS
HEART RATE: 60 BPM | OXYGEN SATURATION: 96 % | HEIGHT: 66 IN | SYSTOLIC BLOOD PRESSURE: 124 MMHG | WEIGHT: 195 LBS | DIASTOLIC BLOOD PRESSURE: 70 MMHG | BODY MASS INDEX: 31.34 KG/M2

## 2025-05-21 DIAGNOSIS — K29.00 ACUTE SUPERFICIAL GASTRITIS WITHOUT HEMORRHAGE: ICD-10-CM

## 2025-05-21 DIAGNOSIS — D17.5 LIPOMA OF COLON: ICD-10-CM

## 2025-05-21 DIAGNOSIS — D13.1 FUNDIC GLAND POLYPS OF STOMACH, BENIGN: Primary | ICD-10-CM

## 2025-05-21 PROCEDURE — G8427 DOCREV CUR MEDS BY ELIG CLIN: HCPCS

## 2025-05-21 PROCEDURE — 1123F ACP DISCUSS/DSCN MKR DOCD: CPT

## 2025-05-21 PROCEDURE — 3078F DIAST BP <80 MM HG: CPT

## 2025-05-21 PROCEDURE — 1160F RVW MEDS BY RX/DR IN RCRD: CPT

## 2025-05-21 PROCEDURE — 1036F TOBACCO NON-USER: CPT

## 2025-05-21 PROCEDURE — 99213 OFFICE O/P EST LOW 20 MIN: CPT

## 2025-05-21 PROCEDURE — 1159F MED LIST DOCD IN RCRD: CPT

## 2025-05-21 PROCEDURE — G8417 CALC BMI ABV UP PARAM F/U: HCPCS

## 2025-05-21 PROCEDURE — 3074F SYST BP LT 130 MM HG: CPT

## 2025-05-21 ASSESSMENT — ENCOUNTER SYMPTOMS
SHORTNESS OF BREATH: 0
BLOOD IN STOOL: 0
DIARRHEA: 0
CONSTIPATION: 0
EYES NEGATIVE: 1
VOMITING: 0
GASTROINTESTINAL NEGATIVE: 1
ABDOMINAL PAIN: 0
CHOKING: 0
RESPIRATORY NEGATIVE: 1
TROUBLE SWALLOWING: 0
COUGH: 0
ANAL BLEEDING: 0
NAUSEA: 0
ALLERGIC/IMMUNOLOGIC NEGATIVE: 1

## 2025-05-21 NOTE — PROGRESS NOTES
Genitourinary: Negative.    Musculoskeletal: Negative.    Skin: Negative.    Allergic/Immunologic: Negative.    Neurological: Negative.    Hematological: Negative.    Psychiatric/Behavioral: Negative.         Plan:     Follow up in 1 year or sooner if needed    Orders  No orders of the defined types were placed in this encounter.    Medications  No orders of the defined types were placed in this encounter.        Patient History:     Past Medical History:   Diagnosis Date    CAD (coronary artery disease)     History of adenomatous polyp of colon     Hyperlipidemia     Hypertension     Type II or unspecified type diabetes mellitus without mention of complication, not stated as uncontrolled        Past Surgical History:   Procedure Laterality Date    COLONOSCOPY  05/18/2009    Dr Galvan-Diverticulosis, Tubulo-hyperplastic polyp, 3 yr recall    COLONOSCOPY  01/28/2013    Dr Galvan-to/ablation-TVA, 3 yr recall    COLONOSCOPY  03/11/2016    Dr GRETA Fulton-diverticular disease, 5 yr recall    COLONOSCOPY N/A 03/29/2021    Dr GRETA Fulton-to/tattoo placement-Diverticular disease-AP x 1, 1 yr recall    COLONOSCOPY  03/29/2021    Dr GRETA Fulton-w/tattoo placement-Diverticular disease-AP x 1, 1 yr recall    COLONOSCOPY N/A 03/28/2022    Dr GRETA Fulton-Diverticular disease, tattoo in the rectum, no residual polyp/lesion seen, lipoma in right colon, 5 yr recall    COLONOSCOPY N/A 04/17/2025    Dr GRETA Fulton-Benign appearing lipoma in the right colon, polyp at proximal portion of the previous tattoo-Benign, 4 yr (age 79) recall    COLONOSCOPY N/A 04/17/2025    Dr GRETA Fulton-Benign appearing lipoma in the right colon, polyp at proximal portion of the previous tattoo-Benign, 4 yr (age 79) recall    CORONARY ARTERY BYPASS GRAFT      3 bypasses    CYSTOSCOPY N/A 09/24/2024    AQUABLATION OF PROSTATE (138g) performed by Robert Keita MD at [facility] OR    UPPER GASTROINTESTINAL ENDOSCOPY N/A 04/17/2025    Dr Fulton-Fundic gland polyps, gastritis, no

## 2025-06-05 ENCOUNTER — OFFICE VISIT (OUTPATIENT)
Dept: UROLOGY | Age: 75
End: 2025-06-05
Payer: MEDICARE

## 2025-06-05 VITALS — WEIGHT: 193.6 LBS | HEIGHT: 66 IN | TEMPERATURE: 97.3 F | BODY MASS INDEX: 31.12 KG/M2

## 2025-06-05 DIAGNOSIS — R33.8 BENIGN PROSTATIC HYPERPLASIA WITH URINARY RETENTION: Primary | ICD-10-CM

## 2025-06-05 DIAGNOSIS — N40.1 BENIGN PROSTATIC HYPERPLASIA WITH URINARY RETENTION: Primary | ICD-10-CM

## 2025-06-05 DIAGNOSIS — N31.2 ATONIC BLADDER: ICD-10-CM

## 2025-06-05 LAB
APPEARANCE FLUID: CLEAR
BILIRUBIN, POC: NORMAL
BLOOD URINE, POC: NORMAL
CLARITY, POC: CLEAR
COLOR, POC: YELLOW
GLUCOSE URINE, POC: NORMAL MG/DL
KETONES, POC: NORMAL MG/DL
LEUKOCYTE EST, POC: NORMAL
NITRITE, POC: NORMAL
PH, POC: 5.5
PROTEIN, POC: NORMAL MG/DL
SPECIFIC GRAVITY, POC: 1.03
UROBILINOGEN, POC: 0.2 MG/DL

## 2025-06-05 PROCEDURE — 81002 URINALYSIS NONAUTO W/O SCOPE: CPT | Performed by: NURSE PRACTITIONER

## 2025-06-05 PROCEDURE — 51798 US URINE CAPACITY MEASURE: CPT | Performed by: NURSE PRACTITIONER

## 2025-06-05 PROCEDURE — 99213 OFFICE O/P EST LOW 20 MIN: CPT | Performed by: NURSE PRACTITIONER

## 2025-06-05 PROCEDURE — G8427 DOCREV CUR MEDS BY ELIG CLIN: HCPCS | Performed by: NURSE PRACTITIONER

## 2025-06-05 PROCEDURE — 1159F MED LIST DOCD IN RCRD: CPT | Performed by: NURSE PRACTITIONER

## 2025-06-05 PROCEDURE — 1123F ACP DISCUSS/DSCN MKR DOCD: CPT | Performed by: NURSE PRACTITIONER

## 2025-06-05 PROCEDURE — G8417 CALC BMI ABV UP PARAM F/U: HCPCS | Performed by: NURSE PRACTITIONER

## 2025-06-05 PROCEDURE — 1036F TOBACCO NON-USER: CPT | Performed by: NURSE PRACTITIONER

## 2025-06-05 PROCEDURE — 1160F RVW MEDS BY RX/DR IN RCRD: CPT | Performed by: NURSE PRACTITIONER

## 2025-06-05 RX ORDER — ROSUVASTATIN CALCIUM 10 MG/1
10 TABLET, COATED ORAL
COMMUNITY
Start: 2025-04-29 | End: 2025-07-28

## 2025-06-05 RX ORDER — ISOSORBIDE MONONITRATE 30 MG/1
30 TABLET, EXTENDED RELEASE ORAL DAILY
COMMUNITY
Start: 2025-04-24

## 2025-06-05 RX ORDER — LOSARTAN POTASSIUM 50 MG/1
50 TABLET ORAL DAILY
COMMUNITY
Start: 2025-05-29 | End: 2025-06-28

## 2025-06-05 RX ORDER — ISOSORBIDE MONONITRATE 60 MG/1
60 TABLET, EXTENDED RELEASE ORAL DAILY
COMMUNITY
Start: 2025-05-08

## 2025-06-05 RX ORDER — TAMSULOSIN HYDROCHLORIDE 0.4 MG/1
0.4 CAPSULE ORAL NIGHTLY
Qty: 90 CAPSULE | Refills: 3 | Status: SHIPPED | OUTPATIENT
Start: 2025-06-05

## 2025-06-05 RX ORDER — INSULIN GLARGINE-YFGN 100 [IU]/ML
INJECTION, SOLUTION SUBCUTANEOUS
COMMUNITY
Start: 2025-06-03

## 2025-06-05 RX ORDER — POLYETHYLENE GLYCOL-3350 AND ELECTROLYTES 236; 6.74; 5.86; 2.97; 22.74 G/274.31G; G/274.31G; G/274.31G; G/274.31G; G/274.31G
POWDER, FOR SOLUTION ORAL
COMMUNITY
Start: 2025-04-11

## 2025-06-05 ASSESSMENT — ENCOUNTER SYMPTOMS
NAUSEA: 0
BACK PAIN: 0
VOMITING: 0
ABDOMINAL DISTENTION: 0
ABDOMINAL PAIN: 0

## 2025-06-05 NOTE — PROGRESS NOTES
Zoey Alanis is a 75 y.o., male, Established patient who presents today   Chief Complaint   Patient presents with    Follow-up     I am here today for my 4-6 month follow up.        BPH with urinary retention  Patient with BPH and chronic urinary retention who is failed multiple voiding trials on maximum alpha-blocker therapy with tamsulosin 0.8mg.  He is a diabetic and has some decreased bladder sensation to fullness.  He underwent aqua ablation  for a 138 g prostate and urinary retention on 9/24/2024.  He did have some issues with continuing elevated postvoid residuals after surgery requiring intermittent catheterization for short period of time, however, he has been doing well on Flomax 0.4 mg for several months.  He complains really only of nocturia x 2-3.  He has an AUA symptom score of 2-3/35 with a bother score of 0.  Postvoid residual indicates he is emptying his bladder well.      REVIEW OF SYSTEMS:  Review of Systems   Constitutional:  Negative for chills and fever.   Gastrointestinal:  Negative for abdominal distention, abdominal pain, nausea and vomiting.   Genitourinary:  Negative for difficulty urinating, dysuria, flank pain, frequency, hematuria and urgency.   Musculoskeletal:  Negative for back pain and gait problem.   Psychiatric/Behavioral:  Negative for agitation and confusion.        PHYSICAL EXAM:  Temp 97.3 °F (36.3 °C) (Temporal)   Ht 1.676 m (5' 6\")   Wt 87.8 kg (193 lb 9.6 oz)   BMI 31.25 kg/m²   Physical Exam  Vitals and nursing note reviewed.   Constitutional:       General: He is not in acute distress.     Appearance: Normal appearance. He is not ill-appearing.   Pulmonary:      Effort: Pulmonary effort is normal. No respiratory distress.   Abdominal:      General: There is no distension.      Tenderness: There is no abdominal tenderness. There is no right CVA tenderness or left CVA tenderness.   Neurological:      Mental Status: He is alert and oriented to person, place, and time.

## 2025-07-08 ENCOUNTER — OFFICE VISIT (OUTPATIENT)
Dept: CARDIOLOGY | Facility: CLINIC | Age: 75
End: 2025-07-08
Payer: MEDICARE

## 2025-07-08 VITALS
HEIGHT: 67 IN | SYSTOLIC BLOOD PRESSURE: 122 MMHG | WEIGHT: 190 LBS | HEART RATE: 49 BPM | BODY MASS INDEX: 29.82 KG/M2 | DIASTOLIC BLOOD PRESSURE: 70 MMHG | OXYGEN SATURATION: 98 %

## 2025-07-08 DIAGNOSIS — I25.118 CORONARY ARTERY DISEASE OF NATIVE HEART WITH STABLE ANGINA PECTORIS, UNSPECIFIED VESSEL OR LESION TYPE: Primary | ICD-10-CM

## 2025-07-08 DIAGNOSIS — Z79.4 TYPE 2 DIABETES MELLITUS WITH OTHER CIRCULATORY COMPLICATION, WITH LONG-TERM CURRENT USE OF INSULIN: ICD-10-CM

## 2025-07-08 DIAGNOSIS — Z95.1 S/P CABG X 3: ICD-10-CM

## 2025-07-08 DIAGNOSIS — I10 PRIMARY HYPERTENSION: ICD-10-CM

## 2025-07-08 DIAGNOSIS — E11.59 TYPE 2 DIABETES MELLITUS WITH OTHER CIRCULATORY COMPLICATION, WITH LONG-TERM CURRENT USE OF INSULIN: ICD-10-CM

## 2025-07-08 DIAGNOSIS — E78.2 MIXED HYPERLIPIDEMIA: ICD-10-CM

## 2025-07-08 PROCEDURE — 3078F DIAST BP <80 MM HG: CPT | Performed by: NURSE PRACTITIONER

## 2025-07-08 PROCEDURE — 3074F SYST BP LT 130 MM HG: CPT | Performed by: NURSE PRACTITIONER

## 2025-07-08 PROCEDURE — 99214 OFFICE O/P EST MOD 30 MIN: CPT | Performed by: NURSE PRACTITIONER

## 2025-07-08 PROCEDURE — 1159F MED LIST DOCD IN RCRD: CPT | Performed by: NURSE PRACTITIONER

## 2025-07-08 PROCEDURE — 93000 ELECTROCARDIOGRAM COMPLETE: CPT | Performed by: NURSE PRACTITIONER

## 2025-07-08 PROCEDURE — 1160F RVW MEDS BY RX/DR IN RCRD: CPT | Performed by: NURSE PRACTITIONER

## 2025-07-08 RX ORDER — TAMSULOSIN HYDROCHLORIDE 0.4 MG/1
1 CAPSULE ORAL DAILY
COMMUNITY

## 2025-07-08 RX ORDER — LOSARTAN POTASSIUM 50 MG/1
50 TABLET ORAL DAILY
COMMUNITY

## 2025-07-08 NOTE — PROGRESS NOTES
Subjective:     Encounter Date: 7/8/2025      Patient ID: Sheeba Saunders is a 75 y.o. male.    Chief Complaint: Follow-up CAD    History of Present Illness     The patient presents to follow-up regarding his history of coronary artery disease status post three-vessel CABG in 2010.  At his visit in February 2017 an attempt was made to get him on a PCSK9 inhibitor for hyperlipidemia since he had previously been intolerant to Crestor and Vytorin.  He did receive 2 injections, but later his insurance coverage was not sufficient and he was left with too much out-of-pocket cost, resulting in discontinuation of the medication.  His PCP later put him on low-dose Crestor and he was doing well with this.      Since that time, he has done very well at his annual visits without any chest discomfort or shortness of breath.  He does have some chronic lower extremity edema, more so on the left as of his last office visit.    By way of review, he was in the ER in January with chest pain.  He had normal troponins, normal BNP.  He had a low risk dobutamine stress echocardiogram.    He came back to see me on 4/24 and reported he had been experiencing chest tightness since early January.  He described this as a left-sided chest tightness that occurred after approximately 5 minutes of walking.  It would be relieved with 5 to 10 minutes of rest.  He was not taking nitroglycerin.  He was able to mow on his riding mower and use his weedeater without symptoms.  He reported this was different compared to the symptoms he had prior to CABG, because prior to CABG she also had left arm pain and had not been having any of that in recent months.  He did have some associated exertional dyspnea.  At that visit, I added Imdur 30 mg daily for what was felt to be consistent with  stable angina.    I saw him in May and he was doing much better.  Episodes were occurring infrequently and were much less severe.  Most days he could walk up to 20  minutes with no chest pain.  However his symptoms had not completely resolved and he was interested in further medical therapy adjustments.  He did not have any chest discomfort that would start at rest.  I did uptitrate his Imdur to 60 mg daily.    Today he states he is doing about the same.  He admits he has not been walking quite as much but does occasionally have chest tightness with walking that is relieved quickly with rest.  He has not required nitroglycerin.  He is able to mow on the riding mower for several hours and use the weedeater without any chest pain.  Overall he does not feel limited by his symptoms.  He states due to slow heart rate in PCP office, though asymptomatic, verapamil has been discontinued and he has been started on losartan.  He states at next visit his PCP may discontinue his Lopressor.  He denies any shortness of breath, palpitations, unusual weakness, syncope or near syncope.    The following portions of the patient's history were reviewed and updated as appropriate: allergies, current medications, past family history, past medical history, past social history, past surgical history and problem list.    Review of Systems   Constitutional: Negative for malaise/fatigue.   Cardiovascular:  Positive for chest pain. Negative for claudication, dyspnea on exertion, leg swelling, near-syncope, orthopnea, palpitations, paroxysmal nocturnal dyspnea and syncope.   Respiratory:  Negative for cough and shortness of breath.    Hematologic/Lymphatic: Does not bruise/bleed easily.   Musculoskeletal:  Negative for falls.   Gastrointestinal:  Negative for bloating.   Neurological:  Negative for dizziness, light-headedness and weakness.       Allergies   Allergen Reactions    Dapagliflozin Other (See Comments)    Latex Hives     blisters         Current Outpatient Medications:     aspirin 81 MG EC tablet, Take 1 tablet by mouth Daily., Disp: , Rfl:     Insulin Glargine (BASAGLAR KWIKPEN) 100 UNIT/ML  "injection pen, Inject 30 Units under the skin into the appropriate area as directed Daily., Disp: , Rfl:     isosorbide mononitrate (IMDUR) 60 MG 24 hr tablet, Take 1 tablet by mouth Daily., Disp: 30 tablet, Rfl: 11    losartan (COZAAR) 50 MG tablet, Take 1 tablet by mouth Daily., Disp: , Rfl:     magnesium oxide (MAG-OX) 400 MG tablet, Take 1 tablet by mouth Daily., Disp: , Rfl:     metoprolol tartrate (LOPRESSOR) 25 MG tablet, Take 0.5 tablets by mouth 2 (Two) Times a Day., Disp: , Rfl:     Multiple Vitamin (MULTIVITAMINS PO), Take 1 tablet by mouth Daily., Disp: , Rfl:     nitroglycerin (NITROSTAT) 0.4 MG SL tablet, Place 1 tablet under the tongue Every 5 (Five) Minutes As Needed for Chest Pain. Take no more than 3 doses in 15 minutes., Disp: , Rfl:     omeprazole (priLOSEC) 20 MG capsule, Take 1 capsule by mouth Daily., Disp: , Rfl:     pioglitazone (ACTOS) 30 MG tablet, Take 1 tablet by mouth Daily., Disp: , Rfl:     rosuvastatin (CRESTOR) 10 MG tablet, Take 1 tablet by mouth Daily., Disp: , Rfl:     tamsulosin (FLOMAX) 0.4 MG capsule 24 hr capsule, Take 1 capsule by mouth Daily., Disp: , Rfl:          Objective:    /70   Pulse (!) 49   Ht 170.2 cm (67\")   Wt 86.2 kg (190 lb)   SpO2 98%   BMI 29.76 kg/m²        Vitals and nursing note reviewed.   Constitutional:       General: Not in acute distress.     Appearance: Well-developed and not in distress. Not diaphoretic.   Neck:      Vascular: No JVD.   Pulmonary:      Effort: Pulmonary effort is normal. No respiratory distress.      Breath sounds: Normal breath sounds.   Cardiovascular:      Bradycardia present. Regular rhythm.      Murmurs: There is no murmur.   Edema:     Peripheral edema absent.   Abdominal:      Tenderness: There is no abdominal tenderness.   Skin:     General: Skin is warm and dry.   Neurological:      Mental Status: Alert and oriented to person, place, and time.         Lab Review:            ECG 12 Lead    Date/Time: 7/8/2025 " 10:58 AM  Performed by: Sonia Pathak APRN    Authorized by: Sonia Pathak APRN  Comparison: compared with previous ECG from 5/8/2025  Similar to previous ECG  Rhythm: sinus bradycardia  Ectopy: atrial premature contractions  BPM: 49    Clinical impression: abnormal EKG          Results for orders placed during the hospital encounter of 01/19/25    Adult Stress Echo W/ Cont or Stress Agent if Necessary Per Protocol 01/19/2025  4:40 PM    Interpretation Summary    Baseline ECG of normal sinus rhythm noted at rest. Non-specific ST-T wave changes noted.    With dobutamine infusion, no clinically significant ST segment deviation noted.    Post stress wall motion appears to be normal.    This is considered to represent a low risk dobutamine stress echocardiogram with no significant ECG or echocardiographic evidence for myocardial ischemia.      Assessment:      Problem List Items Addressed This Visit          Cardiac and Vasculature    S/P CABG x 3    HLD (hyperlipidemia)    CAD (coronary artery disease) - Primary    Overview   3v CABG '10 (LIMA-LAD, SVG-D1, SVG-PDA) with subsequent low-risk stress echo in '11)         HTN (hypertension)    Relevant Medications    losartan (COZAAR) 50 MG tablet       Endocrine and Metabolic    DM (diabetes mellitus)     Plan:     1.  Coronary artery disease with chronic stable angina: Established problem, stable.  See notes above regarding his exertional chest tightness that developed in early January-> improved on Imdur but not completely resolved.  We discussed further adjustments in antianginal therapy today including up titration of Imdur or addition of Ranexa.  He would like to hold off for now and is going to gradually increase his walking routine.  If he is limited by chest tightness he states he will call and let us know.    -Continue aspirin 81 mg daily indefinitely  -Continue Crestor 10 mg nightly-previously intolerant to higher doses  -Continue current doses of beta-blocker;  he does have sinus bradycardia but appears to be asymptomatic to this so I would not necessarily say that discontinuation of beta-blocker is required at this time.  - As needed sublingual nitroglycerin  - Continue Imdur 60 mg daily    - We reviewed signs and symptoms consistent with ACS and when to take nitroglycerin and when to report to the ER.  He voices understanding.    - Symptoms are stable now and improved but if he develops lifestyle limiting symptoms despite antianginal therapy, will discuss possibility of cardiac catheterization with Dr. Bennett.      2.  Essential hypertension: Established problem, well-controlled.  Continue current medical therapy.  See notes above regarding recent adjustments by PCP.    3.  Mixed hyperlipidemia: Established problem, stable.  Continue rosuvastatin 10 mg nightly for goal LDL of less than 70.  He has previously been intolerant to higher doses of rosuvastatin.  He previously had cost/coverage issues with PCSK9 inhibitor.  If unable to get to goal or remain at goal on current regimen consider Zetia or Leqvio depending upon panel results.  Recent lipid panel currently unavailable to me-will request.    4.  Diabetes mellitus type 2: Established problem, recent A1c currently unavailable to me.  As previously noted, he reported he cannot take SGLT2 inhibitors due to prior genitourinary issues when taking Farxiga in the past.    Follow-up in 3-6 months with Dr. Bennett, call sooner with symptoms or concerns

## (undated) DEVICE — BLADDER EVACUATOR: Brand: UROVAC BLADDER EVACUATOR

## (undated) DEVICE — CANNULA NSL AD L7FT DIV O2 CO2 W/ M LUERLOCK TRMPT CONN

## (undated) DEVICE — COVER,MAYO STAND,STERILE: Brand: MEDLINE

## (undated) DEVICE — SYRINGE,PISTON,IRRIGATION,60ML,STERILE: Brand: MEDLINE

## (undated) DEVICE — SOLUTION IRRIG 3000ML 0.9% SOD CHL USP UROMATIC PLAS CONT

## (undated) DEVICE — CLEANING SPONGE: Brand: KOALA™

## (undated) DEVICE — BAG,DRAINAGE,4L,A/R TOWER,LL,SLIDE TAP: Brand: MEDLINE

## (undated) DEVICE — ADAPTER CLEANING PORPOISE CLEANING

## (undated) DEVICE — SOLUTION IRRIG 1000ML STRL H2O USP PLAS POUR BTL

## (undated) DEVICE — SYRINGE,TOOMEY,IRRIGATION,70CC,STERILE: Brand: MEDLINE

## (undated) DEVICE — COLON KIT WITH 1.1 OZ ORCA HYDRA SEAL 2 GOWN

## (undated) DEVICE — JELLY,LUBE,STERILE,FLIP TOP,TUBE,4-OZ: Brand: MEDLINE

## (undated) DEVICE — ELECTRODE LOOP DIA24-26FR YEL CUT BPLR DSTL TIP

## (undated) DEVICE — FORCEPS BX 240CM 2.4MM L NDL RAD JAW 4 M00513334

## (undated) DEVICE — GOWN, ORBIS, XLNG/XXLARGE, STRL: Brand: MEDLINE

## (undated) DEVICE — Device

## (undated) DEVICE — ENDO KIT,LOURDES HOSPITAL: Brand: MEDLINE INDUSTRIES, INC.

## (undated) DEVICE — BITE BLOCK ENDOSCP AD 60 FR W/ ADJ STRP PLAS GRN BLOX

## (undated) DEVICE — SUPPLEMENT DIGESTIVE H2O SOL GI-EASE

## (undated) DEVICE — TOWEL,OR,DSP,ST,BL,NONWVN,COTTON: Brand: MEDLINE

## (undated) DEVICE — PACK DRAPE AQUA ABLATION

## (undated) DEVICE — Device: Brand: AQUABEAM HANDPIECE

## (undated) DEVICE — TUBING, SUCTION, 1/4" X 20', STRAIGHT: Brand: MEDLINE INDUSTRIES, INC.

## (undated) DEVICE — GLOVE SURG SZ 75 CRM LTX FREE POLYISOPRENE POLYMER BEAD ANTI

## (undated) DEVICE — CATHETER,FOLEY,3-WAY,22FR,30ML,100%SILI: Brand: MEDLINE

## (undated) DEVICE — BRUSH ENDOSCP 2 END CHN HEDGEHOG

## (undated) DEVICE — SINGLE PORT MANIFOLD: Brand: NEPTUNE 2